# Patient Record
Sex: FEMALE | Race: OTHER | NOT HISPANIC OR LATINO | URBAN - METROPOLITAN AREA
[De-identification: names, ages, dates, MRNs, and addresses within clinical notes are randomized per-mention and may not be internally consistent; named-entity substitution may affect disease eponyms.]

---

## 2018-02-19 ENCOUNTER — INPATIENT (INPATIENT)
Facility: HOSPITAL | Age: 63
LOS: 4 days | Discharge: HOME | DRG: 440 | End: 2018-02-24
Attending: INTERNAL MEDICINE | Admitting: INTERNAL MEDICINE
Payer: COMMERCIAL

## 2018-02-19 VITALS
HEART RATE: 92 BPM | RESPIRATION RATE: 18 BRPM | DIASTOLIC BLOOD PRESSURE: 103 MMHG | TEMPERATURE: 98 F | SYSTOLIC BLOOD PRESSURE: 169 MMHG | WEIGHT: 220.02 LBS | HEIGHT: 61 IN | OXYGEN SATURATION: 96 %

## 2018-02-19 LAB
ALBUMIN SERPL ELPH-MCNC: 4.1 G/DL — SIGNIFICANT CHANGE UP (ref 3.5–5)
ALP SERPL-CCNC: 101 U/L — SIGNIFICANT CHANGE UP (ref 40–120)
ALT FLD-CCNC: 97 U/L DA — HIGH (ref 10–60)
ANION GAP SERPL CALC-SCNC: 9 MMOL/L — SIGNIFICANT CHANGE UP (ref 5–17)
APPEARANCE UR: CLEAR — SIGNIFICANT CHANGE UP
AST SERPL-CCNC: 96 U/L — HIGH (ref 10–40)
BACTERIA # UR AUTO: ABNORMAL /HPF
BASOPHILS # BLD AUTO: 0 K/UL — SIGNIFICANT CHANGE UP (ref 0–0.2)
BASOPHILS NFR BLD AUTO: 0.5 % — SIGNIFICANT CHANGE UP (ref 0–2)
BILIRUB SERPL-MCNC: 1 MG/DL — SIGNIFICANT CHANGE UP (ref 0.2–1.2)
BILIRUB UR-MCNC: NEGATIVE — SIGNIFICANT CHANGE UP
BUN SERPL-MCNC: 15 MG/DL — SIGNIFICANT CHANGE UP (ref 7–18)
CALCIUM SERPL-MCNC: 8.8 MG/DL — SIGNIFICANT CHANGE UP (ref 8.4–10.5)
CHLORIDE SERPL-SCNC: 104 MMOL/L — SIGNIFICANT CHANGE UP (ref 96–108)
CO2 SERPL-SCNC: 23 MMOL/L — SIGNIFICANT CHANGE UP (ref 22–31)
COLOR SPEC: YELLOW — SIGNIFICANT CHANGE UP
CREAT SERPL-MCNC: 0.47 MG/DL — LOW (ref 0.5–1.3)
DIFF PNL FLD: ABNORMAL
EOSINOPHIL # BLD AUTO: 0.1 K/UL — SIGNIFICANT CHANGE UP (ref 0–0.5)
EOSINOPHIL NFR BLD AUTO: 1.6 % — SIGNIFICANT CHANGE UP (ref 0–6)
EPI CELLS # UR: ABNORMAL /HPF
GLUCOSE SERPL-MCNC: 109 MG/DL — HIGH (ref 70–99)
GLUCOSE UR QL: NEGATIVE — SIGNIFICANT CHANGE UP
HCT VFR BLD CALC: 46.6 % — HIGH (ref 34.5–45)
HGB BLD-MCNC: 15.4 G/DL — SIGNIFICANT CHANGE UP (ref 11.5–15.5)
KETONES UR-MCNC: NEGATIVE — SIGNIFICANT CHANGE UP
LEUKOCYTE ESTERASE UR-ACNC: ABNORMAL
LIDOCAIN IGE QN: 4522 U/L — HIGH (ref 73–393)
LYMPHOCYTES # BLD AUTO: 0.7 K/UL — LOW (ref 1–3.3)
LYMPHOCYTES # BLD AUTO: 9.7 % — LOW (ref 13–44)
MCHC RBC-ENTMCNC: 29.1 PG — SIGNIFICANT CHANGE UP (ref 27–34)
MCHC RBC-ENTMCNC: 33.1 GM/DL — SIGNIFICANT CHANGE UP (ref 32–36)
MCV RBC AUTO: 87.8 FL — SIGNIFICANT CHANGE UP (ref 80–100)
MONOCYTES # BLD AUTO: 0.4 K/UL — SIGNIFICANT CHANGE UP (ref 0–0.9)
MONOCYTES NFR BLD AUTO: 5.5 % — SIGNIFICANT CHANGE UP (ref 2–14)
NEUTROPHILS # BLD AUTO: 6.1 K/UL — SIGNIFICANT CHANGE UP (ref 1.8–7.4)
NEUTROPHILS NFR BLD AUTO: 82.6 % — HIGH (ref 43–77)
NITRITE UR-MCNC: NEGATIVE — SIGNIFICANT CHANGE UP
PH UR: 5 — SIGNIFICANT CHANGE UP (ref 5–8)
PLATELET # BLD AUTO: 176 K/UL — SIGNIFICANT CHANGE UP (ref 150–400)
POTASSIUM SERPL-MCNC: 4.4 MMOL/L — SIGNIFICANT CHANGE UP (ref 3.5–5.3)
POTASSIUM SERPL-SCNC: 4.4 MMOL/L — SIGNIFICANT CHANGE UP (ref 3.5–5.3)
PROT SERPL-MCNC: 7.6 G/DL — SIGNIFICANT CHANGE UP (ref 6–8.3)
PROT UR-MCNC: 15
RBC # BLD: 5.3 M/UL — HIGH (ref 3.8–5.2)
RBC # FLD: 11.6 % — SIGNIFICANT CHANGE UP (ref 10.3–14.5)
RBC CASTS # UR COMP ASSIST: ABNORMAL /HPF (ref 0–2)
SODIUM SERPL-SCNC: 136 MMOL/L — SIGNIFICANT CHANGE UP (ref 135–145)
SP GR SPEC: 1.02 — SIGNIFICANT CHANGE UP (ref 1.01–1.02)
UROBILINOGEN FLD QL: 1
WBC # BLD: 7.4 K/UL — SIGNIFICANT CHANGE UP (ref 3.8–10.5)
WBC # FLD AUTO: 7.4 K/UL — SIGNIFICANT CHANGE UP (ref 3.8–10.5)
WBC UR QL: SIGNIFICANT CHANGE UP /HPF (ref 0–5)

## 2018-02-19 PROCEDURE — 74177 CT ABD & PELVIS W/CONTRAST: CPT | Mod: 26

## 2018-02-19 RX ORDER — SODIUM CHLORIDE 9 MG/ML
1000 INJECTION INTRAMUSCULAR; INTRAVENOUS; SUBCUTANEOUS ONCE
Refills: 0 | Status: COMPLETED | OUTPATIENT
Start: 2018-02-19 | End: 2018-02-19

## 2018-02-19 RX ORDER — MORPHINE SULFATE 50 MG/1
4 CAPSULE, EXTENDED RELEASE ORAL ONCE
Refills: 0 | Status: DISCONTINUED | OUTPATIENT
Start: 2018-02-19 | End: 2018-02-19

## 2018-02-19 RX ORDER — ONDANSETRON 8 MG/1
4 TABLET, FILM COATED ORAL ONCE
Refills: 0 | Status: COMPLETED | OUTPATIENT
Start: 2018-02-19 | End: 2018-02-19

## 2018-02-19 RX ADMIN — SODIUM CHLORIDE 4000 MILLILITER(S): 9 INJECTION INTRAMUSCULAR; INTRAVENOUS; SUBCUTANEOUS at 20:41

## 2018-02-19 RX ADMIN — MORPHINE SULFATE 4 MILLIGRAM(S): 50 CAPSULE, EXTENDED RELEASE ORAL at 22:50

## 2018-02-19 RX ADMIN — ONDANSETRON 4 MILLIGRAM(S): 8 TABLET, FILM COATED ORAL at 20:34

## 2018-02-19 RX ADMIN — MORPHINE SULFATE 4 MILLIGRAM(S): 50 CAPSULE, EXTENDED RELEASE ORAL at 20:41

## 2018-02-19 RX ADMIN — MORPHINE SULFATE 4 MILLIGRAM(S): 50 CAPSULE, EXTENDED RELEASE ORAL at 22:51

## 2018-02-20 DIAGNOSIS — E78.5 HYPERLIPIDEMIA, UNSPECIFIED: ICD-10-CM

## 2018-02-20 DIAGNOSIS — R92.8 OTHER ABNORMAL AND INCONCLUSIVE FINDINGS ON DIAGNOSTIC IMAGING OF BREAST: ICD-10-CM

## 2018-02-20 DIAGNOSIS — K29.70 GASTRITIS, UNSPECIFIED, WITHOUT BLEEDING: ICD-10-CM

## 2018-02-20 DIAGNOSIS — K85.90 ACUTE PANCREATITIS WITHOUT NECROSIS OR INFECTION, UNSPECIFIED: ICD-10-CM

## 2018-02-20 DIAGNOSIS — I10 ESSENTIAL (PRIMARY) HYPERTENSION: ICD-10-CM

## 2018-02-20 DIAGNOSIS — Z29.9 ENCOUNTER FOR PROPHYLACTIC MEASURES, UNSPECIFIED: ICD-10-CM

## 2018-02-20 LAB
24R-OH-CALCIDIOL SERPL-MCNC: 11.9 NG/ML — LOW (ref 30–80)
ALBUMIN SERPL ELPH-MCNC: 3.8 G/DL — SIGNIFICANT CHANGE UP (ref 3.5–5)
ALP SERPL-CCNC: 137 U/L — HIGH (ref 40–120)
ALT FLD-CCNC: 303 U/L DA — HIGH (ref 10–60)
ANION GAP SERPL CALC-SCNC: 5 MMOL/L — SIGNIFICANT CHANGE UP (ref 5–17)
AST SERPL-CCNC: 201 U/L — HIGH (ref 10–40)
BASOPHILS # BLD AUTO: 0 K/UL — SIGNIFICANT CHANGE UP (ref 0–0.2)
BASOPHILS NFR BLD AUTO: 0.4 % — SIGNIFICANT CHANGE UP (ref 0–2)
BILIRUB SERPL-MCNC: 1.3 MG/DL — HIGH (ref 0.2–1.2)
BUN SERPL-MCNC: 11 MG/DL — SIGNIFICANT CHANGE UP (ref 7–18)
CALCIUM SERPL-MCNC: 8.7 MG/DL — SIGNIFICANT CHANGE UP (ref 8.4–10.5)
CHLORIDE SERPL-SCNC: 107 MMOL/L — SIGNIFICANT CHANGE UP (ref 96–108)
CHOLEST SERPL-MCNC: 200 MG/DL — HIGH (ref 10–199)
CO2 SERPL-SCNC: 28 MMOL/L — SIGNIFICANT CHANGE UP (ref 22–31)
CREAT SERPL-MCNC: 0.44 MG/DL — LOW (ref 0.5–1.3)
EOSINOPHIL # BLD AUTO: 0.1 K/UL — SIGNIFICANT CHANGE UP (ref 0–0.5)
EOSINOPHIL NFR BLD AUTO: 1.8 % — SIGNIFICANT CHANGE UP (ref 0–6)
FOLATE SERPL-MCNC: 17.5 NG/ML — SIGNIFICANT CHANGE UP (ref 4.8–24.2)
GLUCOSE SERPL-MCNC: 105 MG/DL — HIGH (ref 70–99)
HBA1C BLD-MCNC: 5.5 % — SIGNIFICANT CHANGE UP (ref 4–5.6)
HCT VFR BLD CALC: 42.5 % — SIGNIFICANT CHANGE UP (ref 34.5–45)
HDLC SERPL-MCNC: 55 MG/DL — SIGNIFICANT CHANGE UP (ref 40–125)
HGB BLD-MCNC: 14.4 G/DL — SIGNIFICANT CHANGE UP (ref 11.5–15.5)
INR BLD: 1.04 RATIO — SIGNIFICANT CHANGE UP (ref 0.88–1.16)
LIDOCAIN IGE QN: 984 U/L — HIGH (ref 73–393)
LIPID PNL WITH DIRECT LDL SERPL: 110 MG/DL — SIGNIFICANT CHANGE UP
LYMPHOCYTES # BLD AUTO: 0.7 K/UL — LOW (ref 1–3.3)
LYMPHOCYTES # BLD AUTO: 16.3 % — SIGNIFICANT CHANGE UP (ref 13–44)
MAGNESIUM SERPL-MCNC: 2.3 MG/DL — SIGNIFICANT CHANGE UP (ref 1.6–2.6)
MCHC RBC-ENTMCNC: 30.3 PG — SIGNIFICANT CHANGE UP (ref 27–34)
MCHC RBC-ENTMCNC: 34 GM/DL — SIGNIFICANT CHANGE UP (ref 32–36)
MCV RBC AUTO: 89 FL — SIGNIFICANT CHANGE UP (ref 80–100)
MONOCYTES # BLD AUTO: 0.3 K/UL — SIGNIFICANT CHANGE UP (ref 0–0.9)
MONOCYTES NFR BLD AUTO: 6 % — SIGNIFICANT CHANGE UP (ref 2–14)
NEUTROPHILS # BLD AUTO: 3.3 K/UL — SIGNIFICANT CHANGE UP (ref 1.8–7.4)
NEUTROPHILS NFR BLD AUTO: 75.4 % — SIGNIFICANT CHANGE UP (ref 43–77)
PHOSPHATE SERPL-MCNC: 3.3 MG/DL — SIGNIFICANT CHANGE UP (ref 2.5–4.5)
PLATELET # BLD AUTO: 156 K/UL — SIGNIFICANT CHANGE UP (ref 150–400)
POTASSIUM SERPL-MCNC: 3.6 MMOL/L — SIGNIFICANT CHANGE UP (ref 3.5–5.3)
POTASSIUM SERPL-SCNC: 3.6 MMOL/L — SIGNIFICANT CHANGE UP (ref 3.5–5.3)
PROT SERPL-MCNC: 7.3 G/DL — SIGNIFICANT CHANGE UP (ref 6–8.3)
PROTHROM AB SERPL-ACNC: 11.3 SEC — SIGNIFICANT CHANGE UP (ref 9.8–12.7)
RBC # BLD: 4.77 M/UL — SIGNIFICANT CHANGE UP (ref 3.8–5.2)
RBC # FLD: 12 % — SIGNIFICANT CHANGE UP (ref 10.3–14.5)
SODIUM SERPL-SCNC: 140 MMOL/L — SIGNIFICANT CHANGE UP (ref 135–145)
TOTAL CHOLESTEROL/HDL RATIO MEASUREMENT: 3.6 RATIO — SIGNIFICANT CHANGE UP (ref 3.3–7.1)
TRIGL SERPL-MCNC: 175 MG/DL — HIGH (ref 10–149)
TSH SERPL-MCNC: 1.41 UU/ML — SIGNIFICANT CHANGE UP (ref 0.34–4.82)
VIT B12 SERPL-MCNC: 1066 PG/ML — SIGNIFICANT CHANGE UP (ref 232–1245)
WBC # BLD: 4.4 K/UL — SIGNIFICANT CHANGE UP (ref 3.8–10.5)
WBC # FLD AUTO: 4.4 K/UL — SIGNIFICANT CHANGE UP (ref 3.8–10.5)

## 2018-02-20 PROCEDURE — 76705 ECHO EXAM OF ABDOMEN: CPT | Mod: 26

## 2018-02-20 PROCEDURE — 93010 ELECTROCARDIOGRAM REPORT: CPT

## 2018-02-20 PROCEDURE — 99285 EMERGENCY DEPT VISIT HI MDM: CPT | Mod: 25

## 2018-02-20 RX ORDER — LOSARTAN POTASSIUM 100 MG/1
50 TABLET, FILM COATED ORAL DAILY
Refills: 0 | Status: DISCONTINUED | OUTPATIENT
Start: 2018-02-20 | End: 2018-02-24

## 2018-02-20 RX ORDER — ONDANSETRON 8 MG/1
4 TABLET, FILM COATED ORAL EVERY 4 HOURS
Refills: 0 | Status: DISCONTINUED | OUTPATIENT
Start: 2018-02-20 | End: 2018-02-24

## 2018-02-20 RX ORDER — PANTOPRAZOLE SODIUM 20 MG/1
40 TABLET, DELAYED RELEASE ORAL
Refills: 0 | Status: DISCONTINUED | OUTPATIENT
Start: 2018-02-20 | End: 2018-02-24

## 2018-02-20 RX ORDER — SODIUM CHLORIDE 9 MG/ML
1000 INJECTION, SOLUTION INTRAVENOUS
Refills: 0 | Status: DISCONTINUED | OUTPATIENT
Start: 2018-02-20 | End: 2018-02-21

## 2018-02-20 RX ORDER — KETOROLAC TROMETHAMINE 30 MG/ML
15 SYRINGE (ML) INJECTION EVERY 4 HOURS
Refills: 0 | Status: DISCONTINUED | OUTPATIENT
Start: 2018-02-20 | End: 2018-02-24

## 2018-02-20 RX ORDER — SIMVASTATIN 20 MG/1
40 TABLET, FILM COATED ORAL AT BEDTIME
Refills: 0 | Status: DISCONTINUED | OUTPATIENT
Start: 2018-02-20 | End: 2018-02-21

## 2018-02-20 RX ORDER — ENOXAPARIN SODIUM 100 MG/ML
40 INJECTION SUBCUTANEOUS DAILY
Refills: 0 | Status: DISCONTINUED | OUTPATIENT
Start: 2018-02-20 | End: 2018-02-24

## 2018-02-20 RX ORDER — MORPHINE SULFATE 50 MG/1
2 CAPSULE, EXTENDED RELEASE ORAL EVERY 4 HOURS
Refills: 0 | Status: DISCONTINUED | OUTPATIENT
Start: 2018-02-20 | End: 2018-02-24

## 2018-02-20 RX ADMIN — Medication 15 MILLIGRAM(S): at 22:30

## 2018-02-20 RX ADMIN — SIMVASTATIN 40 MILLIGRAM(S): 20 TABLET, FILM COATED ORAL at 22:30

## 2018-02-20 RX ADMIN — SODIUM CHLORIDE 200 MILLILITER(S): 9 INJECTION, SOLUTION INTRAVENOUS at 04:20

## 2018-02-20 RX ADMIN — ENOXAPARIN SODIUM 40 MILLIGRAM(S): 100 INJECTION SUBCUTANEOUS at 12:00

## 2018-02-20 RX ADMIN — Medication 15 MILLIGRAM(S): at 22:45

## 2018-02-20 RX ADMIN — LOSARTAN POTASSIUM 50 MILLIGRAM(S): 100 TABLET, FILM COATED ORAL at 05:57

## 2018-02-20 RX ADMIN — SODIUM CHLORIDE 200 MILLILITER(S): 9 INJECTION, SOLUTION INTRAVENOUS at 18:00

## 2018-02-20 RX ADMIN — MORPHINE SULFATE 4 MILLIGRAM(S): 50 CAPSULE, EXTENDED RELEASE ORAL at 02:12

## 2018-02-20 RX ADMIN — PANTOPRAZOLE SODIUM 40 MILLIGRAM(S): 20 TABLET, DELAYED RELEASE ORAL at 06:49

## 2018-02-20 RX ADMIN — SODIUM CHLORIDE 200 MILLILITER(S): 9 INJECTION, SOLUTION INTRAVENOUS at 23:44

## 2018-02-20 NOTE — H&P ADULT - PROBLEM SELECTOR PLAN 4
Ct scan with some breast density, patient usually gets mammogram every 6 months for f/up , recent mammogram was normal

## 2018-02-20 NOTE — CONSULT NOTE ADULT - SUBJECTIVE AND OBJECTIVE BOX
[  ] STAT REQUEST              [ X ] ROUTINE REQUEST    Patient is a 62 year old female admitted with abdominal pain. GI consulted to evaluate.     HPI:  62 year old female presented with 2 days h/o epigastric abdominal pain radiating to her back associated with nausea and vomiting.  Patient denies hematemesis, hematochezia, melena, fever, chills, chest pain, SOB, palpitation, hematuria, dysuria, hemoptysis, cough or diarrhea.              PAIN MANAGEMENT:  Pain Scale:                7-8 /10  Pain Location:  Epigastric abdominal pain    Prior Colonoscopy: 2 years ago    PAST MEDICAL HISTORY  HLD (hyperlipidemia)  HTN (hypertension)  Breast fibroadenoma  Gastritis      PAST SURGICAL HISTORY  Shoulder surgery  Cholecystectomy  Bladder surgery         Allergies    No Known Allergies    Intolerances        HOME MEDICATIONS    MEDICATIONS  (STANDING):  enoxaparin Injectable 40 milliGRAM(s) SubCutaneous daily  lactated ringers. 1000 milliLiter(s) (200 mL/Hr) IV Continuous <Continuous>  losartan 50 milliGRAM(s) Oral daily  pantoprazole    Tablet 40 milliGRAM(s) Oral before breakfast  simvastatin 40 milliGRAM(s) Oral at bedtime    MEDICATIONS  (PRN):  ketorolac   Injectable 15 milliGRAM(s) IV Push every 4 hours PRN Moderate Pain (4 - 6)  morphine  - Injectable 2 milliGRAM(s) IV Push every 4 hours PRN Severe Pain (7 - 10)  ondansetron Injectable 4 milliGRAM(s) IV Push every 4 hours PRN Nausea and/or Vomiting      SOCIAL HISTORY  Advanced Directives:       [ X ] Full Code       [  ] DNR  Marital Status:         [ X ] M      [  ] S      [  ] D       [  ] W  Children:       [ X ] Yes      [  ] No  Occupation:        [  ] Employed       [ X ] Unemployed       [  ] Retired  Diet:       [X ] Regular       [  ] PEG feeding          [  ] NG tube feeding  Drug Use:           [ X ] Patient denied          [  ] Yes  Alcohol:           [  ] No             [ X ] Yes (socially)         [  ] Yes (chronic)  Tobacco:           [  ] Yes           [X  ] No        FAMILY HISTORY  [ X ] Heart Disease (father)            [  ] Diabetes             [  ] HTN             [  ] Colon Cancer             [  ] Stomach Cancer              [  ] Pancreatic Cancer        VITAL SIGNS  Vital Signs Last 24 Hrs  T(C): 37.2 (20 Feb 2018 15:48), Max: 37.2 (20 Feb 2018 15:48)  T(F): 99 (20 Feb 2018 15:48), Max: 99 (20 Feb 2018 15:48)  HR: 95 (20 Feb 2018 15:48) (75 - 95)  BP: 120/84 (20 Feb 2018 15:48) (120/84 - 169/103)   RR: 18 (20 Feb 2018 15:48) (16 - 18)  SpO2: 99% (20 Feb 2018 15:48) (96% - 99%)   Daily Height in cm: 154.94 (19 Feb 2018 19:59)             CBC Full  -  ( 20 Feb 2018 07:20 )  WBC Count : 4.4 K/uL  Hemoglobin : 14.4 g/dL  Hematocrit : 42.5 %  Platelet Count - Automated : 156 K/uL  Mean Cell Volume : 89.0 fl  Mean Cell Hemoglobin : 30.3 pg  Mean Cell Hemoglobin Concentration : 34.0 gm/dL  Auto Neutrophil # : 3.3 K/uL  Auto Lymphocyte # : 0.7 K/uL  Auto Monocyte # : 0.3 K/uL  Auto Eosinophil # : 0.1 K/uL  Auto Basophil # : 0.0 K/uL  Auto Neutrophil % : 75.4 %  Auto Lymphocyte % : 16.3 %  Auto Monocyte % : 6.0 %  Auto Eosinophil % : 1.8 %  Auto Basophil % : 0.4 %      02-20    140  |  107  |  11  ----------------------------<  105<H>  3.6   |  28  |  0.44<L>    Ca    8.7      20 Feb 2018 07:20  Phos  3.3     02-20  Mg     2.3     02-20    TPro  7.3  /  Alb  3.8  /  TBili  1.3<H>  /  DBili  x   /  AST  201<H>  /  ALT  303<H>  /  AlkPhos  137<H>  02-20    Lipase, Serum: 984 U/L (02-20 @ 07:20)  Lipase, Serum: 4522 U/L (02-19 @ 20:53)    PT/INR - ( 20 Feb 2018 07:20 )   PT: 11.3 sec;   INR: 1.04 ratio       Urinalysis + Microscopic Examination (02.19.18 @ 23:01)    Glucose Qualitative, Urine: Negative    Color: Yellow    Blood, Urine: Large    Urine Appearance: Clear    Bilirubin: Negative    Urobilinogen: 1    Specific Gravity: 1.025    Protein, Urine: 15    pH Urine: 5.0    Leukocyte Esterase Concentration: Trace    Nitrite: Negative    Ketone - Urine: Negative    Red Blood Cell - Urine: 2-5 /HPF    White Blood Cell - Urine: 0-2 /HPF    Epithelial Cells: Occasional /HPF    Bacteria: Trace /HPF    ECG  Ventricular Rate 83 BPM    Atrial Rate 83 BPM    P-R Interval 146 ms    QRS Duration 80 ms     ms    QTc 461 ms    P Axis 38 degrees    R Axis 3 degrees    T Axis 55 degrees    Diagnosis Line Normal sinus rhythm  Normal ECG      RADIOLOGY/IMAGING     EXAM:  CT ABDOMEN AND PELVIS IC                            PROCEDURE DATE:  02/19/2018          INTERPRETATION:  CLINICAL INFORMATION: Abdominal pain and vomiting    COMPARISON: None    PROCEDURE:   CT of the Abdomen and Pelvis was performed with intravenous contrast.   Intravenous contrast: 92 ml Omnipaque 350.   Oral contrast: None.  Sagittal and coronal reformats were performed.    FINDINGS:    LOWER CHEST: Bibasilar atelectasis. Mild cardiomegaly. Asymmetric left   breast density.    LIVER:8 mm hypodensity is nonspecific. Slight intrahepatic ductal   dilatation may be related to postcholecystectomy state  GALLBLADDER: Postcholecystectomy  SPLEEN: Within normal limits.  PANCREAS: Within normal limits.  ADRENALS: Within normal limits.  KIDNEYS/URETERS: Within normal limits.    BLADDER: Underdistended  REPRODUCTIVE ORGANS: Unremarkable    BOWEL: Small hiatal hernia. Limited without oral contrast. Normal   appendix. No evidence of bowel obstruction.  PERITONEUM: No ascites.  VESSELS:  Trace atherosclerosis  RETROPERITONEUM: No lymphadenopathy.    ABDOMINAL WALL: Tiny fat-containing umbilical hernia  BONES: Degenerative changes    IMPRESSION:    No definite acute abnormality.    Asymmetric left breast density. Correlate with mammogram.                 EXAM:  US LIVER AND PANCREAS                            PROCEDURE DATE:  02/20/2018          INTERPRETATION:  Right upper quadrant abdominal ultrasound    Indication: Right upper quadrant abdominal pain.    Right upper quadrant abdominal ultrasoundis performed without a previous   abdominal ultrasound for comparison. The liver spans 19.1 cm which is   enlarged. There is a 1.3 cm hepatic cyst.    Duplex Doppler interrogation of the main portal vein demonstrates normal   hepatopedal flow.    Status post cholecystectomy. Mild common bile duct dilatation at 8.7 mm   in caliber, probably due to post cholecystectomy status.    The pancreas is not well-visualized due to overlying bowel gas.    The right kidney measures 12.9 cm in length which is within normal limits   in size. The right kidney appears grossly unremarkable without   hydronephrosis.    No ascites is detected. IVC and aorta appear grossly unremarkable.    Impression: Status post cholecystectomy.    1.3 cm hepatic cyst.    Hepatomegaly.
Patient is a 62y old  Female who presents with a chief complaint of abdominal pain, nausea, vomiting (2018 02:10)    History of Present Illness:  Chief Complaint/Reason for Admission: abdominal pain, nausea, vomiting	  History of Present Illness: 	  63 Y/O F, Martiniquais speaking, from home, PMhx of HTN, HLD, gastritis, breast fibroadenoma, PShx of cholecystectomy, shoulder surgery, some bladder surgery , came with worsening abdominal pain since last 2 days. Pain is in epigastric region, no radiation, associated with nausea, vomiting 4 times ( clear), burning, no radiation, aggravating factor. Patient reports that since last 2 days she is unable to eat due to that pain. Reports that she ate [place] pizza before having the pain. Had normal colonoscopy , EGD about 2 yrs ago. Got mammogram every 6 months due to fibro fatty tissue. Denies any chest pain, urinary or bowel complains, fever.     INTERVAL HPI/OVERNIGHT EVENTS:  T(C): 36.7 (18 @ 12:10), Max: 36.7 (18 @ 12:10)  HR: 89 (18 @ 12:10) (75 - 92)  BP: 124/81 (18 @ 12:10) (124/81 - 169/103)  RR: 16 (18 @ 12:10) (16 - 18)  SpO2: 98% (18 @ 12:10) (96% - 98%)  Wt(kg): --  I&O's Summary      PAST MEDICAL & SURGICAL HISTORY:  HLD (hyperlipidemia)  HTN (hypertension)  S/P shoulder surgery: Right      SOCIAL HISTORY  Alcohol:  Tobacco:  Illicit substance use:      FAMILY HISTORY:      LABS:                        14.4   4.4   )-----------( 156      ( 2018 07:20 )             42.5     02-20    140  |  107  |  11  ----------------------------<  105<H>  3.6   |  28  |  0.44<L>    Ca    8.7      2018 07:20  Phos  3.3     02-20  Mg     2.3     02-20    TPro  7.3  /  Alb  3.8  /  TBili  1.3<H>  /  DBili  x   /  AST  201<H>  /  ALT  303<H>  /  AlkPhos  137<H>  02-20    PT/INR - ( 2018 07:20 )   PT: 11.3 sec;   INR: 1.04 ratio           Urinalysis Basic - ( 2018 23:01 )    Color: Yellow / Appearance: Clear / S.025 / pH: x  Gluc: x / Ketone: Negative  / Bili: Negative / Urobili: 1   Blood: x / Protein: 15 / Nitrite: Negative   Leuk Esterase: Trace / RBC: 2-5 /HPF / WBC 0-2 /HPF   Sq Epi: x / Non Sq Epi: Occasional /HPF / Bacteria: Trace /HPF      CAPILLARY BLOOD GLUCOSE            Urinalysis Basic - ( 2018 23:01 )    Color: Yellow / Appearance: Clear / S.025 / pH: x  Gluc: x / Ketone: Negative  / Bili: Negative / Urobili: 1   Blood: x / Protein: 15 / Nitrite: Negative   Leuk Esterase: Trace / RBC: 2-5 /HPF / WBC 0-2 /HPF   Sq Epi: x / Non Sq Epi: Occasional /HPF / Bacteria: Trace /HPF        MEDICATIONS  (STANDING):  enoxaparin Injectable 40 milliGRAM(s) SubCutaneous daily  lactated ringers. 1000 milliLiter(s) (200 mL/Hr) IV Continuous <Continuous>  losartan 50 milliGRAM(s) Oral daily  pantoprazole    Tablet 40 milliGRAM(s) Oral before breakfast  simvastatin 40 milliGRAM(s) Oral at bedtime    MEDICATIONS  (PRN):  ketorolac   Injectable 15 milliGRAM(s) IV Push every 4 hours PRN Moderate Pain (4 - 6)  morphine  - Injectable 2 milliGRAM(s) IV Push every 4 hours PRN Severe Pain (7 - 10)  ondansetron Injectable 4 milliGRAM(s) IV Push every 4 hours PRN Nausea and/or Vomiting      REVIEW OF SYSTEMS:  CONSTITUTIONAL: No fever, weight loss, or fatigue  EYES: No eye pain, visual disturbances, or discharge  ENMT:  No difficulty hearing, tinnitus, vertigo; No sinus or throat pain  NECK: No pain or stiffness  RESPIRATORY: No cough, wheezing, chills or hemoptysis; No shortness of breath  CARDIOVASCULAR: No chest pain, palpitations, dizziness, or leg swelling  GASTROINTESTINAL: +abdominal  pain. No nausea, vomiting, or hematemesis; No diarrhea or constipation. No melena or hematochezia.  GENITOURINARY: No dysuria, frequency, hematuria, or incontinence  NEUROLOGICAL: No headaches, memory loss, loss of strength, numbness, or tremors  SKIN: No itching, burning, rashes, or lesions   LYMPH NODES: No enlarged glands  ENDOCRINE: No heat or cold intolerance; No hair loss  MUSCULOSKELETAL: No joint pain or swelling; No muscle, back, or extremity pain  PSYCHIATRIC: No depression, anxiety, mood swings, or difficulty sleeping  HEME/LYMPH: No easy bruising, or bleeding gums  ALLERY AND IMMUNOLOGIC: No hives or eczema    PHYSICAL EXAM:  GENERAL: NAD, well-groomed, well-developed  HEAD:  Atraumatic, Normocephalic  EYES: EOMI, PERRLA, conjunctiva and sclera clear  ENMT: No tonsillar erythema, exudates, or enlargement; Moist mucous membranes, Good dentition, No lesions  NECK: Supple, No JVD, Normal thyroid  NERVOUS SYSTEM:  Alert & Oriented X3, Good concentration; Motor Strength 5/5 B/L upper and lower extremities; DTRs 2+ intact and symmetric  CHEST/LUNG: Clear to percussion bilaterally; No rales, rhonchi, wheezing, or rubs  HEART: Regular rate and rhythm; No murmurs, rubs, or gallops  ABDOMEN: Soft, mod tender on epigastrium  EXTREMITIES:  2+ Peripheral Pulses, No clubbing, cyanosis, or edema  LYMPH: No lymphadenopathy noted  SKIN: No rashes or lesions    RADIOLOGY & ADDITIONAL TESTS:  < from: Xray Chest 2 Views PA/Lat (10.06.13 @ 00:24) >  IMPRESSION: Clear lungs.    < end of copied text >  < from: CT Abdomen and Pelvis w/ IV Cont (18 @ 22:58) >  No definite acute abnormality.    Asymmetric left breast density. Correlate with mammogram.  < from: US Hepatic & Pancreatic (18 @ 10:38) >  Impression: Status post cholecystectomy.    1.3 cm hepatic cyst.    Hepatomegaly.    < end of copied text >      < end of copied text >    Imaging Personally Reviewed:  [ x] YES  [ ] NO    Consultant(s) Notes Reviewed:  [ x] YES  [ ] NO        Care Discussed with Consultants/Other Providers [ x] YES  [ ] NO

## 2018-02-20 NOTE — H&P ADULT - MUSCULOSKELETAL
detailed exam ROM intact/no joint erythema/no joint warmth/no calf tenderness/normal strength/no joint swelling

## 2018-02-20 NOTE — H&P ADULT - GASTROINTESTINAL DETAILS
no masses palpable/bowel sounds normal/no guarding/no organomegaly/no distention/no bruit/no rebound tenderness/no rigidity/soft

## 2018-02-20 NOTE — H&P ADULT - HISTORY OF PRESENT ILLNESS
61 Y/O F, Moroccan speaking, from home, PMhx of HTN, HLD, gastritis, breast fibroadenoma, PShx of cholecystectomy, shoulder surgery, some bladder surgery , came with worsening abdominal pain since last 2 days. Pain is in epigastric region, no radiation, associated with nausea, vomiting 4 times ( clear), burning, no radiation, aggravating factor. Patient reports that since last 2 days she is unable to eat due to that pain. Reports that she ate [place] pizza before having the pain. Had normal colonoscopy , EGD about 2 yrs ago. Got mammogram every 6 months due to fibro fatty tissue. Denies any chest pain, urinary or bowel complains, fever.     Allergies: NKDA  ShxL: Non-smoker, non-alcoholic, no substance abuse   Fhx: Mother had stroke, father DM.     In the ED initial vitals shows BP of 169/103, labs with lipase of >4000, , AST 97, ALT 97, UA negative, CXR with some atelectasis CT scan abdomen with small hypodensity, minimal ductal dilatation breast density, got 1 L NS bolus, morphine 4 mg x 2, Zofran, ECG with NSR at 84 bpm, mild LVH,  admitted to medicine for pancreatitis.

## 2018-02-20 NOTE — ED PROVIDER NOTE - OBJECTIVE STATEMENT
62 female 2 days sharp constant moderate abdominal pain, nausea, vomiring no diarrhea. no fever or chills . epigastric. no hx of such pain. no etoh use. not worse w food.

## 2018-02-20 NOTE — ED PROVIDER NOTE - MEDICAL DECISION MAKING DETAILS
concern for gastritis, pancratitis, gerd, atypical presentation of cp. given reproducable abdominal ttp will ct. labs.

## 2018-02-20 NOTE — H&P ADULT - RS GEN PE MLT RESP DETAILS PC
breath sounds equal/normal/airway patent/no rhonchi/no subcutaneous emphysema/respirations non-labored/no rales/no wheezes/no chest wall tenderness/no intercostal retractions/clear to auscultation bilaterally/good air movement

## 2018-02-20 NOTE — H&P ADULT - PROBLEM SELECTOR PLAN 1
Abdominal pain, nausea, vomiting   Epigastric tenderness   Meets 2/3 criteria   Lipase >4000, ( 4522), TAGs 246  Likely due to hypertriglyceridemia   AST 96, ALT 97  Ct scan with no pancreatitis, small hypodensity 8 mm in liver, minimal ductal dilatation  S/p 1 L NS, morphine   C/w LR at 200 cc, NPO, zofran, toradol, morphine for pain control  GI consult Dr Fink

## 2018-02-20 NOTE — CONSULT NOTE ADULT - ASSESSMENT
1. Abdominal pain  2. Acute pancreatitis  3. R/o CBD stone    Suggestions:    1. NPO  2. IVF hydration  3. Protonix daily  4. MRCP  5. Avoid NSAID  6. Follow up LFT's  7. DVT prophylaxis

## 2018-02-21 ENCOUNTER — TRANSCRIPTION ENCOUNTER (OUTPATIENT)
Age: 63
End: 2018-02-21

## 2018-02-21 DIAGNOSIS — R74.0 NONSPECIFIC ELEVATION OF LEVELS OF TRANSAMINASE AND LACTIC ACID DEHYDROGENASE [LDH]: ICD-10-CM

## 2018-02-21 LAB
ANION GAP SERPL CALC-SCNC: 7 MMOL/L — SIGNIFICANT CHANGE UP (ref 5–17)
BUN SERPL-MCNC: 7 MG/DL — SIGNIFICANT CHANGE UP (ref 7–18)
CALCIUM SERPL-MCNC: 8.4 MG/DL — SIGNIFICANT CHANGE UP (ref 8.4–10.5)
CHLORIDE SERPL-SCNC: 107 MMOL/L — SIGNIFICANT CHANGE UP (ref 96–108)
CO2 SERPL-SCNC: 29 MMOL/L — SIGNIFICANT CHANGE UP (ref 22–31)
CREAT SERPL-MCNC: 0.41 MG/DL — LOW (ref 0.5–1.3)
GLUCOSE SERPL-MCNC: 90 MG/DL — SIGNIFICANT CHANGE UP (ref 70–99)
HCT VFR BLD CALC: 42.1 % — SIGNIFICANT CHANGE UP (ref 34.5–45)
HGB BLD-MCNC: 13.6 G/DL — SIGNIFICANT CHANGE UP (ref 11.5–15.5)
LIDOCAIN IGE QN: 92 U/L — SIGNIFICANT CHANGE UP (ref 73–393)
MCHC RBC-ENTMCNC: 28.4 PG — SIGNIFICANT CHANGE UP (ref 27–34)
MCHC RBC-ENTMCNC: 32.2 GM/DL — SIGNIFICANT CHANGE UP (ref 32–36)
MCV RBC AUTO: 88.2 FL — SIGNIFICANT CHANGE UP (ref 80–100)
PLATELET # BLD AUTO: 141 K/UL — LOW (ref 150–400)
POTASSIUM SERPL-MCNC: 3.4 MMOL/L — LOW (ref 3.5–5.3)
POTASSIUM SERPL-SCNC: 3.4 MMOL/L — LOW (ref 3.5–5.3)
RBC # BLD: 4.78 M/UL — SIGNIFICANT CHANGE UP (ref 3.8–5.2)
RBC # FLD: 12.1 % — SIGNIFICANT CHANGE UP (ref 10.3–14.5)
SODIUM SERPL-SCNC: 143 MMOL/L — SIGNIFICANT CHANGE UP (ref 135–145)
WBC # BLD: 3.7 K/UL — LOW (ref 3.8–10.5)
WBC # FLD AUTO: 3.7 K/UL — LOW (ref 3.8–10.5)

## 2018-02-21 RX ORDER — SODIUM CHLORIDE 9 MG/ML
1000 INJECTION, SOLUTION INTRAVENOUS
Refills: 0 | Status: DISCONTINUED | OUTPATIENT
Start: 2018-02-21 | End: 2018-02-24

## 2018-02-21 RX ADMIN — ENOXAPARIN SODIUM 40 MILLIGRAM(S): 100 INJECTION SUBCUTANEOUS at 12:03

## 2018-02-21 RX ADMIN — Medication 15 MILLIGRAM(S): at 12:22

## 2018-02-21 RX ADMIN — Medication 15 MILLIGRAM(S): at 12:03

## 2018-02-21 RX ADMIN — LOSARTAN POTASSIUM 50 MILLIGRAM(S): 100 TABLET, FILM COATED ORAL at 06:33

## 2018-02-21 RX ADMIN — SODIUM CHLORIDE 100 MILLILITER(S): 9 INJECTION, SOLUTION INTRAVENOUS at 23:51

## 2018-02-21 RX ADMIN — Medication 15 MILLIGRAM(S): at 23:51

## 2018-02-21 RX ADMIN — PANTOPRAZOLE SODIUM 40 MILLIGRAM(S): 20 TABLET, DELAYED RELEASE ORAL at 06:33

## 2018-02-21 NOTE — PROGRESS NOTE ADULT - SUBJECTIVE AND OBJECTIVE BOX
I examined the pt at bedside, pt was c/o mild upper abd tenderness radiating to back, pt has not had a BM since monday. pt will be scheduled for MRCP as per GI. Pt is NPO.     REVIEW OF SYSTEMS:    CONSTITUTIONAL: No weakness, fevers or chills  EYES/ENT: No visual changes;  No vertigo or throat pain   NECK: No pain or stiffness  RESPIRATORY: No cough, wheezing, hemoptysis; No shortness of breath  CARDIOVASCULAR: No chest pain or palpitations  GASTROINTESTINAL: + abdominal  epigastric pain. No nausea, vomiting, or hematemesis; No diarrhea or constipation. No melena or hematochezia.  GENITOURINARY: No dysuria, frequency or hematuria  NEUROLOGICAL: No numbness or weakness  SKIN: No itching, burning, rashes, or lesions   All other review of systems is negative unless indicated above.    Physical Exam    General: WN/WD NAD  Neurology: A&Ox3, nonfocal, NAILS x 4  Respiratory: CTA B/L  CV: RRR, S1S2, no murmurs, rubs or gallops  Abdominal: Soft, Tender on epigastrium, ND +BS, Last BM  Extremities: No edema, + peripheral pulses      Allergies  No Known Allergies      Health Issues  Acute pancreatitis without infection or necrosis  HLD (hyperlipidemia)  HTN (hypertension)  S/P shoulder surgery      Vitals  T(F): 98.5 (02-21-18 @ 08:11), Max: 99 (02-20-18 @ 15:48)  HR: 75 (02-21-18 @ 08:11) (68 - 95)  BP: 135/77 (02-21-18 @ 08:11) (120/84 - 151/88)  RR: 16 (02-21-18 @ 08:11) (16 - 18)  SpO2: 99% (02-21-18 @ 08:11) (97% - 100%)  Wt(kg): --  CAPILLARY BLOOD GLUCOSE          Labs                          13.6   3.7   )-----------( 141      ( 21 Feb 2018 06:14 )             42.1       02-21    143  |  107  |  7   ----------------------------<  90  3.4<L>   |  29  |  0.41<L>    Ca    8.4      21 Feb 2018 06:14  Phos  3.3     02-20  Mg     2.3     02-20    TPro  7.3  /  Alb  3.8  /  TBili  1.3<H>  /  DBili  x   /  AST  201<H>  /  ALT  303<H>  /  AlkPhos  137<H>  02-20            Radiology Results      Meds    MEDICATIONS  (STANDING):  enoxaparin Injectable 40 milliGRAM(s) SubCutaneous daily  lactated ringers. 1000 milliLiter(s) (200 mL/Hr) IV Continuous <Continuous>  losartan 50 milliGRAM(s) Oral daily  pantoprazole    Tablet 40 milliGRAM(s) Oral before breakfast  simvastatin 40 milliGRAM(s) Oral at bedtime      MEDICATIONS  (PRN):  ketorolac   Injectable 15 milliGRAM(s) IV Push every 4 hours PRN Moderate Pain (4 - 6)  morphine  - Injectable 2 milliGRAM(s) IV Push every 4 hours PRN Severe Pain (7 - 10)  ondansetron Injectable 4 milliGRAM(s) IV Push every 4 hours PRN Nausea and/or Vomiting

## 2018-02-21 NOTE — PROGRESS NOTE ADULT - SUBJECTIVE AND OBJECTIVE BOX
[   ] ICU                                          [   ] CCU                                      [ X  ] Medical Floor    Patient is comfortable. No new complaints reported, Patient reports less abdominal pain but no N/V, hematemesis, hematochezia, melena, fever, chills, chest pain, SOB, cough or diarrhea.    VITALS  Vital Signs Last 24 Hrs  T(C): 36.9 (21 Feb 2018 15:39), Max: 36.9 (21 Feb 2018 08:11)  T(F): 98.5 (21 Feb 2018 15:39), Max: 98.5 (21 Feb 2018 08:11)  HR: 75 (21 Feb 2018 15:39) (68 - 77)  BP: 130/85 (21 Feb 2018 15:39) (121/68 - 151/88)   RR: 16 (21 Feb 2018 15:39) (16 - 18)  SpO2: 96% (21 Feb 2018 15:39) (96% - 100%)       MEDICATIONS  (STANDING):  enoxaparin Injectable 40 milliGRAM(s) SubCutaneous daily  lactated ringers. 1000 milliLiter(s) (200 mL/Hr) IV Continuous <Continuous>  losartan 50 milliGRAM(s) Oral daily  pantoprazole    Tablet 40 milliGRAM(s) Oral before breakfast    MEDICATIONS  (PRN):  ketorolac   Injectable 15 milliGRAM(s) IV Push every 4 hours PRN Moderate Pain (4 - 6)  morphine  - Injectable 2 milliGRAM(s) IV Push every 4 hours PRN Severe Pain (7 - 10)  ondansetron Injectable 4 milliGRAM(s) IV Push every 4 hours PRN Nausea and/or Vomiting                            13.6   3.7   )-----------( 141      ( 21 Feb 2018 06:14 )             42.1       02-21    143  |  107  |  7   ----------------------------<  90  3.4<L>   |  29  |  0.41<L>    Ca    8.4      21 Feb 2018 06:14  Phos  3.3     02-20  Mg     2.3     02-20    TPro  7.3  /  Alb  3.8  /  TBili  1.3<H>  /  DBili  x   /  AST  201<H>  /  ALT  303<H>  /  AlkPhos  137<H>  02-20      PT/INR - ( 20 Feb 2018 07:20 )   PT: 11.3 sec;   INR: 1.04 ratio         EXAM:  US LIVER AND PANCREAS                            PROCEDURE DATE:  02/20/2018          INTERPRETATION:  Right upper quadrant abdominal ultrasound    Indication: Right upper quadrant abdominal pain.    Right upper quadrant abdominal ultrasoundis performed without a previous   abdominal ultrasound for comparison. The liver spans 19.1 cm which is   enlarged. There is a 1.3 cm hepatic cyst.    Duplex Doppler interrogation of the main portal vein demonstrates normal   hepatopedal flow.    Status post cholecystectomy. Mild common bile duct dilatation at 8.7 mm   in caliber, probably due to post cholecystectomy status.    The pancreas is not well-visualized due to overlying bowel gas.    The right kidney measures 12.9 cm in length which is within normal limits   in size. The right kidney appears grossly unremarkable without   hydronephrosis.    No ascites is detected. IVC and aorta appear grossly unremarkable.    Impression: Status post cholecystectomy.    1.3 cm hepatic cyst.    Hepatomegaly.

## 2018-02-21 NOTE — PROGRESS NOTE ADULT - SUBJECTIVE AND OBJECTIVE BOX
pt seen in icu [  ], reg med floor [  x ], bed [ x ], chair at bedside [   ]  Awake, alert, comfortable OOB to chair in NAD. Still with abdominal pain  REVIEW OF SYSTEMS:    CONSTITUTIONAL: No weakness, fevers or chills  EYES/ENT: No visual changes;  No vertigo or throat pain   NECK: No pain or stiffness  RESPIRATORY: No cough, wheezing, hemoptysis; No shortness of breath  CARDIOVASCULAR: No chest pain or palpitations  GASTROINTESTINAL: + abdominal  epigastric pain. No nausea, vomiting, or hematemesis; No diarrhea or constipation. No melena or hematochezia.  GENITOURINARY: No dysuria, frequency or hematuria  NEUROLOGICAL: No numbness or weakness  SKIN: No itching, burning, rashes, or lesions   All other review of systems is negative unless indicated above.    Physical Exam    General: WN/WD NAD  Neurology: A&Ox3, nonfocal, NAILS x 4  Respiratory: CTA B/L  CV: RRR, S1S2, no murmurs, rubs or gallops  Abdominal: Soft, Tender on epigastrium, ND +BS, Last BM  Extremities: No edema, + peripheral pulses      Allergies  No Known Allergies      Health Issues  Acute pancreatitis without infection or necrosis  HLD (hyperlipidemia)  HTN (hypertension)  S/P shoulder surgery      Vitals  T(F): 98.5 (02-21-18 @ 08:11), Max: 99 (02-20-18 @ 15:48)  HR: 75 (02-21-18 @ 08:11) (68 - 95)  BP: 135/77 (02-21-18 @ 08:11) (120/84 - 151/88)  RR: 16 (02-21-18 @ 08:11) (16 - 18)  SpO2: 99% (02-21-18 @ 08:11) (97% - 100%)  Wt(kg): --  CAPILLARY BLOOD GLUCOSE          Labs                          13.6   3.7   )-----------( 141      ( 21 Feb 2018 06:14 )             42.1       02-21    143  |  107  |  7   ----------------------------<  90  3.4<L>   |  29  |  0.41<L>    Ca    8.4      21 Feb 2018 06:14  Phos  3.3     02-20  Mg     2.3     02-20    TPro  7.3  /  Alb  3.8  /  TBili  1.3<H>  /  DBili  x   /  AST  201<H>  /  ALT  303<H>  /  AlkPhos  137<H>  02-20            Radiology Results      Meds    MEDICATIONS  (STANDING):  enoxaparin Injectable 40 milliGRAM(s) SubCutaneous daily  lactated ringers. 1000 milliLiter(s) (200 mL/Hr) IV Continuous <Continuous>  losartan 50 milliGRAM(s) Oral daily  pantoprazole    Tablet 40 milliGRAM(s) Oral before breakfast  simvastatin 40 milliGRAM(s) Oral at bedtime      MEDICATIONS  (PRN):  ketorolac   Injectable 15 milliGRAM(s) IV Push every 4 hours PRN Moderate Pain (4 - 6)  morphine  - Injectable 2 milliGRAM(s) IV Push every 4 hours PRN Severe Pain (7 - 10)  ondansetron Injectable 4 milliGRAM(s) IV Push every 4 hours PRN Nausea and/or Vomiting

## 2018-02-22 PROCEDURE — 74183 MRI ABD W/O CNTR FLWD CNTR: CPT | Mod: 26

## 2018-02-22 RX ORDER — SODIUM CHLORIDE 9 MG/ML
1000 INJECTION, SOLUTION INTRAVENOUS
Refills: 0 | Status: COMPLETED | OUTPATIENT
Start: 2018-02-22 | End: 2018-02-23

## 2018-02-22 RX ADMIN — Medication 0.5 MILLIGRAM(S): at 09:55

## 2018-02-22 RX ADMIN — SODIUM CHLORIDE 100 MILLILITER(S): 9 INJECTION, SOLUTION INTRAVENOUS at 06:54

## 2018-02-22 RX ADMIN — SODIUM CHLORIDE 50 MILLILITER(S): 9 INJECTION, SOLUTION INTRAVENOUS at 22:49

## 2018-02-22 RX ADMIN — SODIUM CHLORIDE 100 MILLILITER(S): 9 INJECTION, SOLUTION INTRAVENOUS at 15:51

## 2018-02-22 RX ADMIN — PANTOPRAZOLE SODIUM 40 MILLIGRAM(S): 20 TABLET, DELAYED RELEASE ORAL at 06:54

## 2018-02-22 RX ADMIN — ENOXAPARIN SODIUM 40 MILLIGRAM(S): 100 INJECTION SUBCUTANEOUS at 11:32

## 2018-02-22 RX ADMIN — LOSARTAN POTASSIUM 50 MILLIGRAM(S): 100 TABLET, FILM COATED ORAL at 06:54

## 2018-02-22 RX ADMIN — Medication 15 MILLIGRAM(S): at 00:15

## 2018-02-22 NOTE — PROGRESS NOTE ADULT - GASTROINTESTINAL DETAILS
no distention/soft/nontender/no guarding/no rebound tenderness/no rigidity
no rigidity/no rebound tenderness/soft/bowel sounds normal/no guarding/nontender/no distention

## 2018-02-22 NOTE — PROGRESS NOTE ADULT - SUBJECTIVE AND OBJECTIVE BOX
[   ] ICU                                          [   ] CCU                                      [  X ] Medical Floor    Patient is comfortable. No new complaints reported, No abdominal pain, N/V, hematemesis, hematochezia, melena, fever, chills, chest pain, SOB, cough or diarrhea reported.    VITALS  Vital Signs Last 24 Hrs  T(C): 37 (22 Feb 2018 15:51), Max: 37 (22 Feb 2018 15:51)  T(F): 98.6 (22 Feb 2018 15:51), Max: 98.6 (22 Feb 2018 15:51)  HR: 88 (22 Feb 2018 15:51) (71 - 88)  BP: 138/87 (22 Feb 2018 15:51) (130/79 - 152/78)  BP(mean): --  RR: 16 (22 Feb 2018 15:51) (16 - 16)  SpO2: 96% (22 Feb 2018 15:51) (96% - 96%)       MEDICATIONS  (STANDING):  enoxaparin Injectable 40 milliGRAM(s) SubCutaneous daily  lactated ringers. 1000 milliLiter(s) (100 mL/Hr) IV Continuous <Continuous>  losartan 50 milliGRAM(s) Oral daily  pantoprazole    Tablet 40 milliGRAM(s) Oral before breakfast    MEDICATIONS  (PRN):  ketorolac   Injectable 15 milliGRAM(s) IV Push every 4 hours PRN Moderate Pain (4 - 6)  morphine  - Injectable 2 milliGRAM(s) IV Push every 4 hours PRN Severe Pain (7 - 10)  ondansetron Injectable 4 milliGRAM(s) IV Push every 4 hours PRN Nausea and/or Vomiting                            14.6   4.2   )-----------( 151      ( 22 Feb 2018 06:00 )             42.9       02-22    141  |  105  |  8   ----------------------------<  76  3.3<L>   |  28  |  0.41<L>    Ca    8.4      22 Feb 2018 06:00        EXAM:  MR MRCP [facility]                            PROCEDURE DATE:  02/22/2018          INTERPRETATION:  Abdominal MR without and with IV contrast including MRCP    Indication: Acute pancreatitis.    Technique: Utilizing a 1.5 Luisana high-field magnet, multiplanar   multisequence MR images of the abdomen are acquired without and with IV   contrast (7.5 cc Gadavist administered, 2.5 cc discarded), supplemented   by thin and thick slab MRCP images. Postcontrast images are acquired   dynamically.    Comparison: No prior abdominal MR is available for comparison.    Findings: The gallbladder is not visualized, probably surgically absent.   The common bile duct measures 1.0 cm in diameter which is mildly dilated.   No evidence for choledocholithiasis. The main pancreatic duct in the   pancreatic head is mildly dilated at 4.1 mm in diameter. Within the   second part of the duodenum in the region of the sphincter of Oddi, there   is a 1.1 cm soft tissue density structure which may represent a prominent   sphincter or an ampullary tumor protruding into the duodenal lumen.    2 hepatic cysts are seen measuring up to 1.0 cm. The hepatic vessels are   patent.    The pancreas appears unremarkable without evidence for peripancreatic   edema or fluid. There is normal enhancement of the pancreas without   evidence for necrosis.    The spleen, and adrenals appear unremarkable. Tiny renal cysts   bilaterally.    No evidence for ascites, or enlarged lymph node.     There is a hemangioma in T9 vertebra.    Impression: The gallbladder is not visualized, probably surgically absent.    No evidence for choledocholithiasis. Mild dilatation of the common bile   duct. The main pancreatic duct in the pancreatic head is mildly dilated   as well. Within thesecond part of the duodenum in the region of the   sphincter of Oddi, there is a 1.1 cm soft tissue density structure which   may represent a prominent sphincter or an obstructive ampullary tumor   protruding into the duodenal lumen. ERCP and endoscopic ultrasound may be   pursued for further evaluation.    No MR evidence for acute appendicitis.    Other findings as abov

## 2018-02-22 NOTE — PROGRESS NOTE ADULT - NEGATIVE ENMT SYMPTOMS
no dysphagia/no ear pain/no gum bleeding/no dry mouth/no throat pain/no hearing difficulty
no hearing difficulty/no dysphagia/no gum bleeding/no dry mouth/no throat pain/no ear pain

## 2018-02-22 NOTE — PROGRESS NOTE ADULT - RS GEN PE MLT RESP DETAILS PC
respirations non-labored/clear to auscultation bilaterally/breath sounds equal/airway patent/good air movement
breath sounds equal/good air movement/airway patent

## 2018-02-22 NOTE — PROGRESS NOTE ADULT - SUBJECTIVE AND OBJECTIVE BOX
Patient is a 62y old  Female who presents with a chief complaint of abdominal pain, nausea, vomiting (20 Feb 2018 02:10)  awake, alert, comfortable in bed in NAD    INTERVAL HPI/OVERNIGHT EVENTS:      VITAL SIGNS:  T(F): 98 (02-22-18 @ 08:13)  HR: 74 (02-22-18 @ 08:13)  BP: 130/79 (02-22-18 @ 08:13)  RR: 16 (02-22-18 @ 08:13)  SpO2: 96% (02-22-18 @ 08:13)  Wt(kg): --  I&O's Detail          REVIEW OF SYSTEMS:    CONSTITUTIONAL:  No fevers, chills, sweats    HEENT:  Eyes:  No diplopia or blurred vision. ENT:  No earache, sore throat or runny nose.    CARDIOVASCULAR:  No pressure, squeezing, tightness, or heaviness about the chest; no palpitations.    RESPIRATORY:  Per HPI    GASTROINTESTINAL:  No abdominal pain, nausea, vomiting or diarrhea.    GENITOURINARY:  No dysuria, frequency or urgency.    NEUROLOGIC:  No paresthesias, fasciculations, seizures or weakness.    PSYCHIATRIC:  No disorder of thought or mood.      PHYSICAL EXAM:    Constitutional: Well developed and nourished  Eyes:Perrla  ENMT: normal  Neck:supple  Respiratory: good air entry  Cardiovascular: S1 S2 regular  Gastrointestinal: Soft, Non tender  Extremities: No edema  Vascular:normal  Neurological:Awake, alert,Ox3  Musculoskeletal:Normal      MEDICATIONS  (STANDING):  enoxaparin Injectable 40 milliGRAM(s) SubCutaneous daily  lactated ringers. 1000 milliLiter(s) (100 mL/Hr) IV Continuous <Continuous>  losartan 50 milliGRAM(s) Oral daily  pantoprazole    Tablet 40 milliGRAM(s) Oral before breakfast    MEDICATIONS  (PRN):  ketorolac   Injectable 15 milliGRAM(s) IV Push every 4 hours PRN Moderate Pain (4 - 6)  morphine  - Injectable 2 milliGRAM(s) IV Push every 4 hours PRN Severe Pain (7 - 10)  ondansetron Injectable 4 milliGRAM(s) IV Push every 4 hours PRN Nausea and/or Vomiting      Allergies    No Known Allergies    Intolerances        LABS:                        14.6   4.2   )-----------( 151      ( 22 Feb 2018 06:00 )             42.9     02-22    141  |  105  |  8   ----------------------------<  76  3.3<L>   |  28  |  0.41<L>    Ca    8.4      22 Feb 2018 06:00                CAPILLARY BLOOD GLUCOSE            RADIOLOGY & ADDITIONAL TESTS:    CXR:    Ct scan chest:  < from: CT Abdomen and Pelvis w/ IV Cont (02.19.18 @ 22:58) >  IMPRESSION:    No definite acute abnormality.    Asymmetric left breast density. Correlate with mammogram.      < end of copied text >    ekg;    echo:

## 2018-02-22 NOTE — PROGRESS NOTE ADULT - PROBLEM SELECTOR PLAN 1
Clear liquid diet  GI eval  replace lytes Clear liquid diet and advance if tolerated  GI follow up  replace christine

## 2018-02-22 NOTE — PROGRESS NOTE ADULT - NEGATIVE MUSCULOSKELETAL SYMPTOMS
no muscle cramps/no arthralgia/no neck pain/no stiffness
no neck pain/no arthralgia/no stiffness/no muscle cramps

## 2018-02-22 NOTE — PROGRESS NOTE ADULT - NEGATIVE GASTROINTESTINAL SYMPTOMS
no diarrhea/no nausea/no vomiting/no hematochezia/no melena/no jaundice
no diarrhea/no vomiting/no hematochezia/no melena/no jaundice/no nausea

## 2018-02-22 NOTE — PROGRESS NOTE ADULT - SUBJECTIVE AND OBJECTIVE BOX
Patient is a 62y old  Female who presents with a chief complaint of abdominal pain, nausea, vomiting (20 Feb 2018 02:10)      Overnight Events: None, denies abdominal pain nor F/N/V/C/ SOB.    REVIEW OF SYSTEMS:    CONSTITUTIONAL: No weakness, fevers or chills  RESPIRATORY: No cough, wheezing, hemoptysis; No shortness of breath  CARDIOVASCULAR: No chest pain or palpitations  GASTROINTESTINAL: No abdominal or epigastric pain. No nausea, vomiting, or hematemesis; No diarrhea or constipation. No melena or hematochezia.  NEUROLOGICAL: No numbness or weakness  All other review of systems is negative unless indicated above.    MEDICATIONS  (STANDING):  enoxaparin Injectable 40 milliGRAM(s) SubCutaneous daily  lactated ringers. 1000 milliLiter(s) (100 mL/Hr) IV Continuous <Continuous>  losartan 50 milliGRAM(s) Oral daily  pantoprazole    Tablet 40 milliGRAM(s) Oral before breakfast    MEDICATIONS  (PRN):  ketorolac   Injectable 15 milliGRAM(s) IV Push every 4 hours PRN Moderate Pain (4 - 6)  morphine  - Injectable 2 milliGRAM(s) IV Push every 4 hours PRN Severe Pain (7 - 10)  ondansetron Injectable 4 milliGRAM(s) IV Push every 4 hours PRN Nausea and/or Vomiting    Vital Signs Last 24 Hrs  T(C): 36.7 (22 Feb 2018 08:13), Max: 36.9 (21 Feb 2018 15:39)  T(F): 98 (22 Feb 2018 08:13), Max: 98.5 (21 Feb 2018 15:39)  HR: 74 (22 Feb 2018 08:13) (71 - 75)  BP: 130/79 (22 Feb 2018 08:13) (130/79 - 152/78)  BP(mean): --  RR: 16 (22 Feb 2018 08:13) (16 - 16)  SpO2: 96% (22 Feb 2018 08:13) (96% - 96%)    General: WN/WD NAD  Neurology: A&Ox3  Respiratory: CTA B/L, no wheezes, no rhonchi, no rales  CV: RRR, S1S2, no murmur  Abdominal: Soft, NT, ND no palpable mass, bowel sounds positive  MSK: No edema, + peripheral pulses      Labs:                        14.6   4.2   )-----------( 151      ( 22 Feb 2018 06:00 )             42.9     02-22    141  |  105  |  8   ----------------------------<  76  3.3<L>   |  28  |  0.41<L>    Ca    8.4      22 Feb 2018 06:00      02-20 Chol 200<H>  HDL 55 Trig 175<H>, 02-19 Chol -- LDL -- HDL -- Trig 246<H>

## 2018-02-22 NOTE — PROGRESS NOTE ADULT - ASSESSMENT
1. Pancreatitis  2. R/o ampullary mass  3. Abdominal pain    Suggestions:    1. EGD  2. Protonix daily  3. Avoid NSAID  4. DVT prophylaxis
1. Pancreatitis  2. Elevated LFT's     3. R/o CBD stone    Suggestions:    1. Clear liquid diet  2. Protonix daily  3. Monitor electrolytes  4. MRCP  5. DVT prophylaxis  6. Follow up LFT's

## 2018-02-22 NOTE — PROGRESS NOTE ADULT - NEUROLOGICAL DETAILS
alert and oriented x 3/responds to verbal commands/sensation intact/responds to pain
alert and oriented x 3/responds to verbal commands/sensation intact/responds to pain

## 2018-02-22 NOTE — PROGRESS NOTE ADULT - NEGATIVE NEUROLOGICAL SYMPTOMS
no vertigo/no syncope/no tremors/no headache
no vertigo/no syncope/no tremors/no confusion/no hemiparesis/no headache

## 2018-02-23 ENCOUNTER — RESULT REVIEW (OUTPATIENT)
Age: 63
End: 2018-02-23

## 2018-02-23 PROCEDURE — 88312 SPECIAL STAINS GROUP 1: CPT | Mod: 26

## 2018-02-23 PROCEDURE — 88305 TISSUE EXAM BY PATHOLOGIST: CPT | Mod: 26

## 2018-02-23 RX ADMIN — Medication 15 MILLIGRAM(S): at 18:50

## 2018-02-23 RX ADMIN — Medication 15 MILLIGRAM(S): at 19:32

## 2018-02-23 RX ADMIN — PANTOPRAZOLE SODIUM 40 MILLIGRAM(S): 20 TABLET, DELAYED RELEASE ORAL at 06:02

## 2018-02-23 RX ADMIN — LOSARTAN POTASSIUM 50 MILLIGRAM(S): 100 TABLET, FILM COATED ORAL at 06:02

## 2018-02-23 RX ADMIN — SODIUM CHLORIDE 50 MILLILITER(S): 9 INJECTION, SOLUTION INTRAVENOUS at 11:22

## 2018-02-23 NOTE — DIETITIAN INITIAL EVALUATION ADULT. - NUTRITION INTERVENTION
Meals and Snack/Nutrition Education/Collaboration and Referral of Nutrition Care/Medical Food Supplements/Vitamin

## 2018-02-23 NOTE — PROGRESS NOTE ADULT - PROBLEM SELECTOR PLAN 1
MRCP results noted and discussed with GI  For EGD today and inspection of the ampulla for tumor  May need ERCP later

## 2018-02-23 NOTE — PROGRESS NOTE ADULT - PROBLEM SELECTOR PLAN 1
HI hinkle  replace christine  going for MRCP today NPO  GI eval  replace lytes  MRCP results reviewed.  Pt going for EGD today with Dr. Fink

## 2018-02-23 NOTE — DIETITIAN INITIAL EVALUATION ADULT. - MD RECOMMEND
po supplement/Advance diet with nutrition supplement as medically feasible, or consider alternate nutrition support if NPO prolonged

## 2018-02-23 NOTE — DIETITIAN INITIAL EVALUATION ADULT. - OTHER INFO
Patient seen for NPO >3days. Patient from home lives with family. Visited pt. reports appetite fair 2-3days with abdominal pain/nausea & vomiting but continued with eating small meals PTA, stated  Lbs >2yrs & having been gaining wt. but unable to give amounts but never attempted wt. loss but wants to manage her wt. once d/c & requested diet education, give copy of My Plate Planner with wt. loss tips in [place], receptive to information given, presently pt. awaiting EGD & NPO for test, skin intact. D/W RN.

## 2018-02-23 NOTE — PROGRESS NOTE ADULT - SUBJECTIVE AND OBJECTIVE BOX
Patient is a 62y old  Female who presents with a chief complaint of abdominal pain, nausea, vomiting (20 Feb 2018 02:10)  Awake, alert, comfortable in bed in NAD    INTERVAL HPI/OVERNIGHT EVENTS:      VITAL SIGNS:  T(F): 98.2 (02-23-18 @ 08:19)  HR: 64 (02-23-18 @ 08:19)  BP: 122/71 (02-23-18 @ 08:19)  RR: 16 (02-23-18 @ 08:19)  SpO2: 98% (02-23-18 @ 08:19)  Wt(kg): --  I&O's Detail          REVIEW OF SYSTEMS:    CONSTITUTIONAL:  No fevers, chills, sweats    HEENT:  Eyes:  No diplopia or blurred vision. ENT:  No earache, sore throat or runny nose.    CARDIOVASCULAR:  No pressure, squeezing, tightness, or heaviness about the chest; no palpitations.    RESPIRATORY:  Per HPI    GASTROINTESTINAL:  No abdominal pain, nausea, vomiting or diarrhea.    GENITOURINARY:  No dysuria, frequency or urgency.    NEUROLOGIC:  No paresthesias, fasciculations, seizures or weakness.    PSYCHIATRIC:  No disorder of thought or mood.      PHYSICAL EXAM:    Constitutional: Well developed and nourished  Eyes:Perrla  ENMT: normal  Neck:supple  Respiratory: good air entry  Cardiovascular: S1 S2 regular  Gastrointestinal: Soft, Non tender  Extremities: No edema  Vascular:normal  Neurological:Awake, alert,Ox3  Musculoskeletal:Normal      MEDICATIONS  (STANDING):  enoxaparin Injectable 40 milliGRAM(s) SubCutaneous daily  lactated ringers. 1000 milliLiter(s) (100 mL/Hr) IV Continuous <Continuous>  losartan 50 milliGRAM(s) Oral daily  pantoprazole    Tablet 40 milliGRAM(s) Oral before breakfast    MEDICATIONS  (PRN):  ketorolac   Injectable 15 milliGRAM(s) IV Push every 4 hours PRN Moderate Pain (4 - 6)  morphine  - Injectable 2 milliGRAM(s) IV Push every 4 hours PRN Severe Pain (7 - 10)  ondansetron Injectable 4 milliGRAM(s) IV Push every 4 hours PRN Nausea and/or Vomiting      Allergies    No Known Allergies    Intolerances        LABS:                        14.4   4.9   )-----------( 167      ( 23 Feb 2018 08:40 )             42.6     02-23    140  |  104  |  15  ----------------------------<  70  3.5   |  26  |  0.51    Ca    8.8      23 Feb 2018 08:40                CAPILLARY BLOOD GLUCOSE            RADIOLOGY & ADDITIONAL TESTS:  < from: MR MRCP w/wo IV Cont (02.22.18 @ 14:24) >  Impression: The gallbladder is not visualized, probably surgically absent.    No evidence for choledocholithiasis. Mild dilatation of the common bile   duct. The main pancreatic duct in the pancreatic head is mildly dilated   as well. Within thesecond part of the duodenum in the region of the   sphincter of Oddi, there is a 1.1 cm soft tissue density structure which   may represent a prominent sphincter or an obstructive ampullary tumor   protruding into the duodenal lumen. ERCP and endoscopic ultrasound may be   pursued for further evaluation.    No MR evidence for acute appendicitis.    Other findings as above.    < end of copied text >    CXR:    Ct scan chest:    ekg;    echo:

## 2018-02-23 NOTE — PROGRESS NOTE ADULT - SUBJECTIVE AND OBJECTIVE BOX
Patient is a 62y old  Female who presents with a chief complaint of abdominal pain, nausea, vomiting (20 Feb 2018 02:10)      Overnight Events: None    REVIEW OF SYSTEMS:    CONSTITUTIONAL: No weakness, fevers or chills  RESPIRATORY: No cough, wheezing, hemoptysis; No shortness of breath  CARDIOVASCULAR: No chest pain or palpitations  GASTROINTESTINAL: No abdominal or epigastric pain. No nausea, vomiting, or hematemesis; No diarrhea or constipation. No melena or hematochezia.  NEUROLOGICAL: No numbness or weakness  All other review of systems is negative unless indicated above.    MEDICATIONS  (STANDING):  enoxaparin Injectable 40 milliGRAM(s) SubCutaneous daily  lactated ringers. 1000 milliLiter(s) (100 mL/Hr) IV Continuous <Continuous>  losartan 50 milliGRAM(s) Oral daily  pantoprazole    Tablet 40 milliGRAM(s) Oral before breakfast    MEDICATIONS  (PRN):  ketorolac   Injectable 15 milliGRAM(s) IV Push every 4 hours PRN Moderate Pain (4 - 6)  morphine  - Injectable 2 milliGRAM(s) IV Push every 4 hours PRN Severe Pain (7 - 10)  ondansetron Injectable 4 milliGRAM(s) IV Push every 4 hours PRN Nausea and/or Vomiting    Vital Signs Last 24 Hrs  T(C): 36.8 (23 Feb 2018 08:19), Max: 37 (22 Feb 2018 15:51)  T(F): 98.2 (23 Feb 2018 08:19), Max: 98.6 (22 Feb 2018 15:51)  HR: 64 (23 Feb 2018 08:19) (64 - 88)  BP: 122/71 (23 Feb 2018 08:19) (122/71 - 138/87)  BP(mean): --  RR: 16 (23 Feb 2018 08:19) (16 - 16)  SpO2: 98% (23 Feb 2018 08:19) (96% - 98%)    General: WN/WD NAD  Neurology: A&Ox3  Respiratory: CTA B/L, no wheezes, no rhonchi, no rales  CV: RRR, S1S2, no murmur  Abdominal: Soft, NT, ND no palpable mass, bowel sounds positive  MSK: No edema, + peripheral pulses      Labs:                        14.4   4.9   )-----------( 167      ( 23 Feb 2018 08:40 )             42.6     02-23    140  |  104  |  15  ----------------------------<  70  3.5   |  26  |  0.51    Ca    8.8      23 Feb 2018 08:40      02-20 Chol 200<H>  HDL 55 Trig 175<H>, 02-19 Chol -- LDL -- HDL -- Trig 246<H> Patient is a 62y old  Female who presents with a chief complaint of abdominal pain, nausea, vomiting (20 Feb 2018 02:10)      Overnight Events: None. Pt denies discomfort or abdominal pain.     REVIEW OF SYSTEMS:    CONSTITUTIONAL: No weakness, fevers or chills  RESPIRATORY: No cough, wheezing, hemoptysis; No shortness of breath  CARDIOVASCULAR: No chest pain or palpitations  GASTROINTESTINAL: No abdominal or epigastric pain. No nausea, vomiting, or hematemesis; No diarrhea or constipation. No melena or hematochezia.  NEUROLOGICAL: No numbness or weakness  All other review of systems is negative unless indicated above.    MEDICATIONS  (STANDING):  enoxaparin Injectable 40 milliGRAM(s) SubCutaneous daily  lactated ringers. 1000 milliLiter(s) (100 mL/Hr) IV Continuous <Continuous>  losartan 50 milliGRAM(s) Oral daily  pantoprazole    Tablet 40 milliGRAM(s) Oral before breakfast    MEDICATIONS  (PRN):  ketorolac   Injectable 15 milliGRAM(s) IV Push every 4 hours PRN Moderate Pain (4 - 6)  morphine  - Injectable 2 milliGRAM(s) IV Push every 4 hours PRN Severe Pain (7 - 10)  ondansetron Injectable 4 milliGRAM(s) IV Push every 4 hours PRN Nausea and/or Vomiting    Vital Signs Last 24 Hrs  T(C): 36.8 (23 Feb 2018 08:19), Max: 37 (22 Feb 2018 15:51)  T(F): 98.2 (23 Feb 2018 08:19), Max: 98.6 (22 Feb 2018 15:51)  HR: 64 (23 Feb 2018 08:19) (64 - 88)  BP: 122/71 (23 Feb 2018 08:19) (122/71 - 138/87)  BP(mean): --  RR: 16 (23 Feb 2018 08:19) (16 - 16)  SpO2: 98% (23 Feb 2018 08:19) (96% - 98%)    General: WN/WD NAD  Neurology: A&Ox3  Respiratory: CTA B/L, no wheezes, no rhonchi, no rales  CV: RRR, S1S2, no murmur  Abdominal: Soft, NT, ND no palpable mass, bowel sounds positive  MSK: No edema, + peripheral pulses      Labs:                        14.4   4.9   )-----------( 167      ( 23 Feb 2018 08:40 )             42.6     02-23    140  |  104  |  15  ----------------------------<  70  3.5   |  26  |  0.51    Ca    8.8      23 Feb 2018 08:40      02-20 Chol 200<H>  HDL 55 Trig 175<H>, 02-19 Chol -- LDL -- HDL -- Trig 246<H>    < from: MR MRCP w/wo IV Cont (02.22.18 @ 14:24) >    EXAM:  MR MRCP [facility]                            PROCEDURE DATE:  02/22/2018          INTERPRETATION:  Abdominal MR without and with IV contrast including MRCP    Indication: Acute pancreatitis.    Technique: Utilizing a 1.5 Luisana high-field magnet, multiplanar   multisequence MR images of the abdomen are acquired without and with IV   contrast (7.5 cc Gadavist administered, 2.5 cc discarded), supplemented   by thin and thick slab MRCP images. Postcontrast images are acquired   dynamically.    Comparison: No prior abdominal MR is available for comparison.    Findings: The gallbladder is not visualized, probably surgically absent.   The common bile duct measures 1.0 cm in diameter which is mildly dilated.   No evidence for choledocholithiasis. The main pancreatic duct in the   pancreatic head is mildly dilated at 4.1 mm in diameter. Within the   second part of the duodenum in the region of the sphincter of Oddi, there   is a 1.1 cm soft tissue density structure which may represent a prominent   sphincter or an ampullary tumor protruding into the duodenal lumen.    2 hepatic cysts are seen measuring up to 1.0 cm. The hepatic vessels are   patent.    The pancreas appears unremarkable without evidence for peripancreatic   edema or fluid. There is normal enhancement of the pancreas without   evidence for necrosis.    The spleen, and adrenals appear unremarkable. Tiny renal cysts   bilaterally.    No evidence for ascites, or enlarged lymph node.     There is a hemangioma in T9 vertebra.    Impression: The gallbladder is not visualized, probably surgically absent.    No evidence for choledocholithiasis. Mild dilatation of the common bile   duct. The main pancreatic duct in the pancreatic head is mildly dilated   as well. Within thesecond part of the duodenum in the region of the   sphincter of Oddi, there is a 1.1 cm soft tissue density structure which   may represent a prominent sphincter or an obstructive ampullary tumor   protruding into the duodenal lumen. ERCP and endoscopic ultrasound may be   pursued for further evaluation.    No MR evidence for acute appendicitis.    Other findings as above.    < end of copied text >

## 2018-02-24 ENCOUNTER — TRANSCRIPTION ENCOUNTER (OUTPATIENT)
Age: 63
End: 2018-02-24

## 2018-02-24 VITALS
DIASTOLIC BLOOD PRESSURE: 84 MMHG | SYSTOLIC BLOOD PRESSURE: 150 MMHG | OXYGEN SATURATION: 96 % | TEMPERATURE: 98 F | RESPIRATION RATE: 17 BRPM | HEART RATE: 74 BPM

## 2018-02-24 RX ORDER — PANTOPRAZOLE SODIUM 20 MG/1
1 TABLET, DELAYED RELEASE ORAL
Qty: 14 | Refills: 0
Start: 2018-02-24 | End: 2018-03-09

## 2018-02-24 RX ADMIN — LOSARTAN POTASSIUM 50 MILLIGRAM(S): 100 TABLET, FILM COATED ORAL at 06:19

## 2018-02-24 RX ADMIN — PANTOPRAZOLE SODIUM 40 MILLIGRAM(S): 20 TABLET, DELAYED RELEASE ORAL at 06:19

## 2018-02-24 NOTE — DISCHARGE NOTE ADULT - CARE PLAN
Principal Discharge DX:	Acute pancreatitis  Goal:	complete resolution  Assessment and plan of treatment:	MRCP - The gallbladder is not visualized, probably surgically absent. No evidence for choledocholithiasis. Mild dilatation of the common bile duct. The main pancreatic duct in the pancreatic head is mildly dilated   as well. Within the second part of the duodenum in the region of the sphincter of Oddi, there is a 1.1 cm soft tissue density structure which may represent a prominent sphincter or an obstructive ampullary tumor protruding into the duodenal lumen.  follow with outpatient GI for ERCP with ultrasonic endoscopy to rule out Common bile duct tumor.  Secondary Diagnosis:	HLD (hyperlipidemia)  Assessment and plan of treatment:	well controlled during hosptial stay   continue home medications  Secondary Diagnosis:	HTN (hypertension)  Assessment and plan of treatment:	well controlled during hosptial stay   continue home medications

## 2018-02-24 NOTE — PROGRESS NOTE ADULT - PROBLEM SELECTOR PROBLEM 3
HLD (hyperlipidemia)

## 2018-02-24 NOTE — DISCHARGE NOTE ADULT - MEDICATION SUMMARY - MEDICATIONS TO TAKE
I will START or STAY ON the medications listed below when I get home from the hospital:    losartan 25 mg oral tablet  -- 1 tab(s) by mouth once a day  -- Indication: For HTN (hypertension)    simvastatin 20 mg oral tablet  -- 1 tab(s) by mouth once a day (at bedtime)  -- Indication: For HLD (hyperlipidemia)

## 2018-02-24 NOTE — PROGRESS NOTE ADULT - PROBLEM SELECTOR PLAN 2
monitor BP  cont meds

## 2018-02-24 NOTE — PROGRESS NOTE ADULT - PROBLEM SELECTOR PROBLEM 1
Acute pancreatitis without infection or necrosis, unspecified pancreatitis type

## 2018-02-24 NOTE — PROGRESS NOTE ADULT - PROVIDER SPECIALTY LIST ADULT
Gastroenterology
Gastroenterology
Internal Medicine
Pulmonology

## 2018-02-24 NOTE — DISCHARGE NOTE ADULT - CARE PROVIDER_API CALL
Duy Fink), Medicine  [address] [place] [address]  Phone: [phone]  Fax: [phone]

## 2018-02-24 NOTE — PROGRESS NOTE ADULT - PROBLEM SELECTOR PLAN 1
NPO  GI eval  replace lytes  MRCP results reviewed.  Pt going for EGD today with Dr. Fink cont meds  GI follow up as OP  replace christine  MRCP results reviewed.  Stable for DC home

## 2018-02-24 NOTE — DISCHARGE NOTE ADULT - PATIENT PORTAL LINK FT
You can access the [facility] Patient Portal, offered by [facility], by registering with the following website: [email or web address]

## 2018-02-24 NOTE — PROGRESS NOTE ADULT - SUBJECTIVE AND OBJECTIVE BOX
pt seen in icu [  ], reg med floor [ x ], bed [ x ], chair at bedside [   ]  Pt alert, awake, oriented x3, resting comfortable in NAD.  REVIEW OF SYSTEMS:    CONSTITUTIONAL: No weakness, fevers or chills  EYES/ENT: No visual changes;  No vertigo or throat pain   NECK: No pain or stiffness  RESPIRATORY: No cough, wheezing, hemoptysis; No shortness of breath  CARDIOVASCULAR: No chest pain or palpitations  GASTROINTESTINAL: No abdominal or epigastric pain. No nausea, vomiting, or hematemesis; No diarrhea or constipation. No melena or hematochezia.  GENITOURINARY: No dysuria, frequency or hematuria  NEUROLOGICAL: No numbness or weakness  SKIN: No itching, burning, rashes, or lesions   All other review of systems is negative unless indicated above.    Physical Exam    General: WN/WD NAD  Neurology: A&Ox3, nonfocal, NAILS x 4  Respiratory: CTA B/L  CV: RRR, S1S2, no murmurs, rubs or gallops  Abdominal: Soft, NT, ND +BS, Last BM  Extremities: No edema, + peripheral pulses      Allergies  No Known Allergies      Health Issues  Acute pancreatitis without infection or necrosis  HLD (hyperlipidemia)  HTN (hypertension)  S/P shoulder surgery      Vitals  T(F): 97.9 (02-24-18 @ 08:06), Max: 98.6 (02-23-18 @ 16:23)  HR: 87 (02-24-18 @ 08:06) (67 - 87)  BP: 125/89 (02-24-18 @ 08:06) (125/89 - 134/84)  RR: 16 (02-24-18 @ 08:06) (16 - 16)  SpO2: 97% (02-24-18 @ 08:06) (95% - 99%)  Wt(kg): --  CAPILLARY BLOOD GLUCOSE          Labs                          15.3   6.2   )-----------( 206      ( 24 Feb 2018 07:17 )             48.0       02-24    140  |  104  |  16  ----------------------------<  102<H>  3.6   |  26  |  0.57    Ca    8.7      24 Feb 2018 07:17              Radiology Results  < from: Xray Chest 2 Views PA/Lat (10.06.13 @ 00:24) >  FINDINGS:  The lungsare clear.   There are no pleural effusions or pneumothorax.  The cardiomediastinal structures are within normal limits.  The visualized osseous structures are unremarkable.    IMPRESSION: Clear lungs.    < end of copied text >      Meds    MEDICATIONS  (STANDING):  enoxaparin Injectable 40 milliGRAM(s) SubCutaneous daily  lactated ringers. 1000 milliLiter(s) (100 mL/Hr) IV Continuous <Continuous>  losartan 50 milliGRAM(s) Oral daily  pantoprazole    Tablet 40 milliGRAM(s) Oral before breakfast      MEDICATIONS  (PRN):  ketorolac   Injectable 15 milliGRAM(s) IV Push every 4 hours PRN Moderate Pain (4 - 6)  morphine  - Injectable 2 milliGRAM(s) IV Push every 4 hours PRN Severe Pain (7 - 10)  ondansetron Injectable 4 milliGRAM(s) IV Push every 4 hours PRN Nausea and/or Vomiting pt seen in icu [  ], reg med floor [ x ], bed [ x ], chair at bedside [   ]  Pt alert, awake, oriented x3, resting comfortable in NAD. S/p EGD. No evidence of ampullary mass. Will need ERCP with EUS as OP as per GI  REVIEW OF SYSTEMS:    CONSTITUTIONAL: No weakness, fevers or chills  EYES/ENT: No visual changes;  No vertigo or throat pain   NECK: No pain or stiffness  RESPIRATORY: No cough, wheezing, hemoptysis; No shortness of breath  CARDIOVASCULAR: No chest pain or palpitations  GASTROINTESTINAL: No abdominal or epigastric pain. No nausea, vomiting, or hematemesis; No diarrhea or constipation. No melena or hematochezia.  GENITOURINARY: No dysuria, frequency or hematuria  NEUROLOGICAL: No numbness or weakness  SKIN: No itching, burning, rashes, or lesions   All other review of systems is negative unless indicated above.    Physical Exam    General: WN/WD NAD  Neurology: A&Ox3, nonfocal, NAILS x 4  Respiratory: CTA B/L  CV: RRR, S1S2, no murmurs, rubs or gallops  Abdominal: Soft, NT, ND +BS, Last BM  Extremities: No edema, + peripheral pulses      Allergies  No Known Allergies      Health Issues  Acute pancreatitis without infection or necrosis  HLD (hyperlipidemia)  HTN (hypertension)  S/P shoulder surgery      Vitals  T(F): 97.9 (02-24-18 @ 08:06), Max: 98.6 (02-23-18 @ 16:23)  HR: 87 (02-24-18 @ 08:06) (67 - 87)  BP: 125/89 (02-24-18 @ 08:06) (125/89 - 134/84)  RR: 16 (02-24-18 @ 08:06) (16 - 16)  SpO2: 97% (02-24-18 @ 08:06) (95% - 99%)  Wt(kg): --  CAPILLARY BLOOD GLUCOSE          Labs                          15.3   6.2   )-----------( 206      ( 24 Feb 2018 07:17 )             48.0       02-24    140  |  104  |  16  ----------------------------<  102<H>  3.6   |  26  |  0.57    Ca    8.7      24 Feb 2018 07:17              Radiology Results  < from: Xray Chest 2 Views PA/Lat (10.06.13 @ 00:24) >  FINDINGS:  The lungsare clear.   There are no pleural effusions or pneumothorax.  The cardiomediastinal structures are within normal limits.  The visualized osseous structures are unremarkable.    IMPRESSION: Clear lungs.    < end of copied text >      Meds    MEDICATIONS  (STANDING):  enoxaparin Injectable 40 milliGRAM(s) SubCutaneous daily  lactated ringers. 1000 milliLiter(s) (100 mL/Hr) IV Continuous <Continuous>  losartan 50 milliGRAM(s) Oral daily  pantoprazole    Tablet 40 milliGRAM(s) Oral before breakfast      MEDICATIONS  (PRN):  ketorolac   Injectable 15 milliGRAM(s) IV Push every 4 hours PRN Moderate Pain (4 - 6)  morphine  - Injectable 2 milliGRAM(s) IV Push every 4 hours PRN Severe Pain (7 - 10)  ondansetron Injectable 4 milliGRAM(s) IV Push every 4 hours PRN Nausea and/or Vomiting

## 2018-02-24 NOTE — PROGRESS NOTE ADULT - PROBLEM SELECTOR PLAN 3
Hold statin due to LFTs elevation  low fat diet
Hold stating due to LFTs elevation

## 2018-02-24 NOTE — DISCHARGE NOTE ADULT - HOSPITAL COURSE
HPI:  63 Y/O F, Ethiopian speaking, from home, PMhx of HTN, HLD, gastritis, breast fibroadenoma, PShx of cholecystectomy, shoulder surgery, some bladder surgery , came with worsening abdominal pain since last 2 days. Pain is in epigastric region, no radiation, associated with nausea, vomiting 4 times ( clear), burning, no radiation, aggravating factor. Patient reports that since last 2 days she is unable to eat due to that pain. Reports that she ate [place] pizza before having the pain. Had normal colonoscopy , EGD about 2 yrs ago. Got mammogram every 6 months due to fibro fatty tissue. Denies any chest pain, urinary or bowel complains, fever.     Allergies: NKDA  ShxL: Non-smoker, non-alcoholic, no substance abuse   Fhx: Mother had stroke, father DM.     In the ED initial vitals shows BP of 169/103, labs with lipase of >4000, , AST 97, ALT 97, UA negative, CXR with some atelectasis CT scan abdomen with small hypodensity, minimal ductal dilatation breast density, got 1 L NS bolus, morphine 4 mg x 2, Zofran, ECG with NSR at 84 bpm, mild LVH,  admitted to medicine for pancreatitis. (20 Feb 2018 02:10)    hospital course:   MRCP - The gallbladder is not visualized, probably surgically absent. No evidence for choledocholithiasis. Mild dilatation of the common bile duct. The main pancreatic duct in the pancreatic head is mildly dilated   as well. Within the second part of the duodenum in the region of the sphincter of Oddi, there is a 1.1 cm soft tissue density structure which may represent a prominent sphincter or an obstructive ampullary tumor protruding into the duodenal lumen.  patient advised to followup with outpatient  GI for ERCP with ultrasonic endoscopy to rule out Common bile duct tumor.  patient is hemodynamically stable and ready for discharge.

## 2018-02-24 NOTE — DISCHARGE NOTE ADULT - PLAN OF CARE
complete resolution MRCP - The gallbladder is not visualized, probably surgically absent. No evidence for choledocholithiasis. Mild dilatation of the common bile duct. The main pancreatic duct in the pancreatic head is mildly dilated   as well. Within the second part of the duodenum in the region of the sphincter of Oddi, there is a 1.1 cm soft tissue density structure which may represent a prominent sphincter or an obstructive ampullary tumor protruding into the duodenal lumen.  follow with outpatient GI for ERCP with ultrasonic endoscopy to rule out Common bile duct tumor. well controlled during hosptial stay   continue home medications

## 2018-02-27 LAB — SURGICAL PATHOLOGY FINAL REPORT - CH: SIGNIFICANT CHANGE UP

## 2018-03-16 ENCOUNTER — EMERGENCY (EMERGENCY)
Facility: HOSPITAL | Age: 63
LOS: 1 days | Discharge: HOME | End: 2018-03-16
Attending: EMERGENCY MEDICINE
Payer: COMMERCIAL

## 2018-03-16 VITALS
RESPIRATION RATE: 16 BRPM | DIASTOLIC BLOOD PRESSURE: 89 MMHG | TEMPERATURE: 98 F | SYSTOLIC BLOOD PRESSURE: 142 MMHG | HEART RATE: 84 BPM | OXYGEN SATURATION: 95 %

## 2018-03-16 LAB
APPEARANCE UR: CLEAR — SIGNIFICANT CHANGE UP
BILIRUB UR-MCNC: ABNORMAL
COLOR SPEC: ABNORMAL
DIFF PNL FLD: ABNORMAL
GLUCOSE UR QL: NEGATIVE — SIGNIFICANT CHANGE UP
KETONES UR-MCNC: NEGATIVE — SIGNIFICANT CHANGE UP
LEUKOCYTE ESTERASE UR-ACNC: ABNORMAL
NITRITE UR-MCNC: POSITIVE
PH UR: 5 — SIGNIFICANT CHANGE UP (ref 5–8)
PROT UR-MCNC: 30 MG/DL
SP GR SPEC: 1.02 — SIGNIFICANT CHANGE UP (ref 1.01–1.02)
UROBILINOGEN FLD QL: 8

## 2018-03-16 PROCEDURE — 99283 EMERGENCY DEPT VISIT LOW MDM: CPT | Mod: 25

## 2018-03-16 RX ORDER — IBUPROFEN 200 MG
800 TABLET ORAL ONCE
Refills: 0 | Status: COMPLETED | OUTPATIENT
Start: 2018-03-16 | End: 2018-03-16

## 2018-03-16 RX ORDER — AZTREONAM 2 G
1 VIAL (EA) INJECTION
Qty: 14 | Refills: 0
Start: 2018-03-16 | End: 2018-03-22

## 2018-03-16 RX ADMIN — Medication 800 MILLIGRAM(S): at 02:46

## 2018-03-16 RX ADMIN — Medication 1 TABLET(S): at 04:19

## 2018-03-16 NOTE — ED PROVIDER NOTE - OBJECTIVE STATEMENT
61 y/o M pt w/ PMHx of HTN and HLD, presents to ED c/o dysuria and suprapubic pain. Pt reports onset x 2 weeks ago, was seen by GYN and given abx (unknown name, finished 2 days ago) and Pyridium which she is still taking. However, sx have not improved so pt came in for evaluation. Pain is non-radiating. Pt denies any fever, vomiting, diarrhea, or any other complaints. NKDA.

## 2018-03-16 NOTE — ED PROVIDER NOTE - PROGRESS NOTE DETAILS
UA cw UTI-given po bactrim  discussed above with pt. patient stable for discharge. Pt reports she will schedule appt with urologist tomorrow.

## 2018-03-19 ENCOUNTER — RESULT REVIEW (OUTPATIENT)
Age: 63
End: 2018-03-19

## 2019-02-22 ENCOUNTER — APPOINTMENT (OUTPATIENT)
Dept: UROLOGY | Facility: CLINIC | Age: 64
End: 2019-02-22
Payer: COMMERCIAL

## 2019-02-22 VITALS
HEIGHT: 61 IN | BODY MASS INDEX: 41.54 KG/M2 | WEIGHT: 220 LBS | SYSTOLIC BLOOD PRESSURE: 146 MMHG | DIASTOLIC BLOOD PRESSURE: 78 MMHG

## 2019-02-22 DIAGNOSIS — Z86.79 PERSONAL HISTORY OF OTHER DISEASES OF THE CIRCULATORY SYSTEM: ICD-10-CM

## 2019-02-22 DIAGNOSIS — Z78.9 OTHER SPECIFIED HEALTH STATUS: ICD-10-CM

## 2019-02-22 DIAGNOSIS — Z86.39 PERSONAL HISTORY OF OTHER ENDOCRINE, NUTRITIONAL AND METABOLIC DISEASE: ICD-10-CM

## 2019-02-22 PROCEDURE — 99204 OFFICE O/P NEW MOD 45 MIN: CPT

## 2019-02-22 RX ORDER — LOSARTAN POTASSIUM 100 MG/1
TABLET, FILM COATED ORAL
Refills: 0 | Status: ACTIVE | COMMUNITY

## 2019-02-22 RX ORDER — SIMVASTATIN 80 MG/1
TABLET, FILM COATED ORAL
Refills: 0 | Status: ACTIVE | COMMUNITY

## 2019-02-22 NOTE — ASSESSMENT
[FreeTextEntry1] : Very pleasant 63-year-old female with microscopic hematuria, recurrent UTIs, dyspareunia\par -urinalysis\par -urine culture\par -CT Urogram\par -BMP\par -cystoscopy\par -Extensive discussion of the potential etiologies of microscopic hematuria, as well as the need to complete full work up.  Patient understands and wishes to proceed.\par -Discussed general preventive strategies for urinary tract infection\par -Start Estrace vaginal cream\par -We discussed the risks of using any estrogen based cream\par

## 2019-02-22 NOTE — HISTORY OF PRESENT ILLNESS
[FreeTextEntry1] : Very pleasant 63 year -year old woman who presents for evaluation of microscopic hematuria found on urinalysis multiple times in the past. She reports no history of gross hematuria.  No flank pain. No suprapubic pain. No dysuria.  No urinary frequency, urgency. Reports a history of urinary tract infections 5-6 times per year over the last few years. No history of renal impairment. No aggravating or alleviating factors that she knows of contributing to hematuria or UTIs. She also reports dyspareunia.\par \par No family history of  malignancy.  No family history of nephrolithiasis. Does not smoke. No family history of gynecologic malignancy.\par  [Urinary Retention] : no urinary retention [Urinary Urgency] : no urinary urgency [Urinary Frequency] : no urinary frequency [Straining] : no straining [Dysuria] : no dysuria [Hematuria - Gross] : no gross hematuria [Hematuria - Microscopic] : microscopic hematuria [Bladder Spasm] : no bladder spasm [Abdominal Pain] : no abdominal pain [Flank Pain] : no flank pain [Fever] : no fever [Fatigue] : no fatigue [Nausea] : no nausea [Anorexia] : no anorexia

## 2019-02-26 LAB
ANION GAP SERPL CALC-SCNC: 17 MMOL/L
BACTERIA UR CULT: NORMAL
BUN SERPL-MCNC: 15 MG/DL
CALCIUM SERPL-MCNC: 9.5 MG/DL
CHLORIDE SERPL-SCNC: 104 MMOL/L
CO2 SERPL-SCNC: 22 MMOL/L
CREAT SERPL-MCNC: 0.51 MG/DL
GLUCOSE SERPL-MCNC: 97 MG/DL
POTASSIUM SERPL-SCNC: 4.3 MMOL/L
SODIUM SERPL-SCNC: 143 MMOL/L

## 2019-03-05 ENCOUNTER — APPOINTMENT (OUTPATIENT)
Dept: UROLOGY | Facility: CLINIC | Age: 64
End: 2019-03-05
Payer: COMMERCIAL

## 2019-03-05 VITALS
BODY MASS INDEX: 41.54 KG/M2 | HEIGHT: 61 IN | SYSTOLIC BLOOD PRESSURE: 126 MMHG | TEMPERATURE: 98.7 F | DIASTOLIC BLOOD PRESSURE: 68 MMHG | WEIGHT: 220 LBS

## 2019-03-05 DIAGNOSIS — N94.10 UNSPECIFIED DYSPAREUNIA: ICD-10-CM

## 2019-03-05 LAB
APPEARANCE: CLEAR
BACTERIA: NEGATIVE
BILIRUBIN URINE: NEGATIVE
BLOOD URINE: ABNORMAL
COLOR: YELLOW
GLUCOSE QUALITATIVE U: NEGATIVE
HYALINE CASTS: 0 /LPF
KETONES URINE: NEGATIVE
LEUKOCYTE ESTERASE URINE: NEGATIVE
MICROSCOPIC-UA: NORMAL
NITRITE URINE: NEGATIVE
PH URINE: 6.5
PROTEIN URINE: NEGATIVE
RED BLOOD CELLS URINE: 6 /HPF
SPECIFIC GRAVITY URINE: 1.02
SQUAMOUS EPITHELIAL CELLS: 4 /HPF
UROBILINOGEN URINE: NORMAL
WHITE BLOOD CELLS URINE: 1 /HPF

## 2019-03-05 PROCEDURE — 99213 OFFICE O/P EST LOW 20 MIN: CPT | Mod: 25

## 2019-03-05 PROCEDURE — 52000 CYSTOURETHROSCOPY: CPT

## 2019-03-05 NOTE — ASSESSMENT
[FreeTextEntry1] : Very pleasant 63-year-old woman who presents for followup of recurrent urinary tract infections, dyspareunia, vaginal dryness, microscopic hematuria\par -Patient still needs CT urogram\par -Lab results reviewed demonstrating 6 red blood cells per high power field\par -I encouraged her to try Estrace vaginal cream, and we again discussed the risks of the medication\par -Followup after CT

## 2019-03-05 NOTE — HISTORY OF PRESENT ILLNESS
[FreeTextEntry1] : Very pleasant 63-year-old woman who presents for followup of microscopic hematuria, dyspareunia, recurrent urinary tract infections. At last visit, patient was prescribed Estrace vaginal cream, however she has not used the medication yet. Today cystoscopy demonstrates no urothelial lesions. She denies dysuria. No hematuria. No flank pain or suprapubic pain. No complaints. [Urinary Retention] : no urinary retention [Urinary Urgency] : no urinary urgency [Urinary Frequency] : no urinary frequency [Straining] : no straining [Dysuria] : no dysuria [Hematuria - Gross] : no gross hematuria [Hematuria - Microscopic] : microscopic hematuria [Bladder Spasm] : no bladder spasm [Abdominal Pain] : no abdominal pain [Flank Pain] : no flank pain [Fever] : no fever [Fatigue] : no fatigue [Nausea] : no nausea [Anorexia] : no anorexia

## 2019-03-12 ENCOUNTER — APPOINTMENT (OUTPATIENT)
Dept: CT IMAGING | Facility: HOSPITAL | Age: 64
End: 2019-03-12
Payer: COMMERCIAL

## 2019-03-12 ENCOUNTER — OUTPATIENT (OUTPATIENT)
Dept: OUTPATIENT SERVICES | Facility: HOSPITAL | Age: 64
LOS: 1 days | End: 2019-03-12
Payer: COMMERCIAL

## 2019-03-12 DIAGNOSIS — R31.29 OTHER MICROSCOPIC HEMATURIA: ICD-10-CM

## 2019-03-12 PROCEDURE — 74178 CT ABD&PLV WO CNTR FLWD CNTR: CPT | Mod: 26

## 2019-03-15 ENCOUNTER — EMERGENCY (EMERGENCY)
Facility: HOSPITAL | Age: 64
LOS: 1 days | Discharge: HOME | End: 2019-03-15
Attending: EMERGENCY MEDICINE | Admitting: EMERGENCY MEDICINE
Payer: COMMERCIAL

## 2019-03-15 VITALS — HEIGHT: 63 IN | WEIGHT: 149.91 LBS

## 2019-03-15 VITALS
SYSTOLIC BLOOD PRESSURE: 154 MMHG | HEART RATE: 89 BPM | RESPIRATION RATE: 18 BRPM | OXYGEN SATURATION: 97 % | DIASTOLIC BLOOD PRESSURE: 92 MMHG

## 2019-03-15 DIAGNOSIS — Z90.49 ACQUIRED ABSENCE OF OTHER SPECIFIED PARTS OF DIGESTIVE TRACT: Chronic | ICD-10-CM

## 2019-03-15 LAB
ACETONE SERPL-MCNC: NEGATIVE — SIGNIFICANT CHANGE UP
ALBUMIN SERPL ELPH-MCNC: 3.6 G/DL — SIGNIFICANT CHANGE UP (ref 3.5–5)
ALP SERPL-CCNC: 81 U/L — SIGNIFICANT CHANGE UP (ref 40–120)
ALT FLD-CCNC: 37 U/L DA — SIGNIFICANT CHANGE UP (ref 10–60)
ANION GAP SERPL CALC-SCNC: 4 MMOL/L — LOW (ref 5–17)
APTT BLD: 34.8 SEC — SIGNIFICANT CHANGE UP (ref 27.5–36.3)
AST SERPL-CCNC: 22 U/L — SIGNIFICANT CHANGE UP (ref 10–40)
BASOPHILS # BLD AUTO: 0.03 K/UL — SIGNIFICANT CHANGE UP (ref 0–0.2)
BASOPHILS NFR BLD AUTO: 0.5 % — SIGNIFICANT CHANGE UP (ref 0–2)
BILIRUB SERPL-MCNC: 0.3 MG/DL — SIGNIFICANT CHANGE UP (ref 0.2–1.2)
BUN SERPL-MCNC: 13 MG/DL — SIGNIFICANT CHANGE UP (ref 7–18)
CALCIUM SERPL-MCNC: 8.3 MG/DL — LOW (ref 8.4–10.5)
CHLORIDE SERPL-SCNC: 108 MMOL/L — SIGNIFICANT CHANGE UP (ref 96–108)
CK SERPL-CCNC: 77 U/L — SIGNIFICANT CHANGE UP (ref 21–215)
CO2 SERPL-SCNC: 28 MMOL/L — SIGNIFICANT CHANGE UP (ref 22–31)
CREAT SERPL-MCNC: 0.8 MG/DL — SIGNIFICANT CHANGE UP (ref 0.5–1.3)
EOSINOPHIL # BLD AUTO: 0.15 K/UL — SIGNIFICANT CHANGE UP (ref 0–0.5)
EOSINOPHIL NFR BLD AUTO: 2.3 % — SIGNIFICANT CHANGE UP (ref 0–6)
GLUCOSE SERPL-MCNC: 125 MG/DL — HIGH (ref 70–99)
HCT VFR BLD CALC: 45 % — SIGNIFICANT CHANGE UP (ref 34.5–45)
HGB BLD-MCNC: 14.5 G/DL — SIGNIFICANT CHANGE UP (ref 11.5–15.5)
IMM GRANULOCYTES NFR BLD AUTO: 0.3 % — SIGNIFICANT CHANGE UP (ref 0–1.5)
INR BLD: 0.99 RATIO — SIGNIFICANT CHANGE UP (ref 0.88–1.16)
LYMPHOCYTES # BLD AUTO: 1.49 K/UL — SIGNIFICANT CHANGE UP (ref 1–3.3)
LYMPHOCYTES # BLD AUTO: 22.9 % — SIGNIFICANT CHANGE UP (ref 13–44)
MAGNESIUM SERPL-MCNC: 2.2 MG/DL — SIGNIFICANT CHANGE UP (ref 1.6–2.6)
MCHC RBC-ENTMCNC: 28.2 PG — SIGNIFICANT CHANGE UP (ref 27–34)
MCHC RBC-ENTMCNC: 32.2 GM/DL — SIGNIFICANT CHANGE UP (ref 32–36)
MCV RBC AUTO: 87.5 FL — SIGNIFICANT CHANGE UP (ref 80–100)
MONOCYTES # BLD AUTO: 0.43 K/UL — SIGNIFICANT CHANGE UP (ref 0–0.9)
MONOCYTES NFR BLD AUTO: 6.6 % — SIGNIFICANT CHANGE UP (ref 2–14)
NEUTROPHILS # BLD AUTO: 4.4 K/UL — SIGNIFICANT CHANGE UP (ref 1.8–7.4)
NEUTROPHILS NFR BLD AUTO: 67.4 % — SIGNIFICANT CHANGE UP (ref 43–77)
NRBC # BLD: 0 /100 WBCS — SIGNIFICANT CHANGE UP (ref 0–0)
PLATELET # BLD AUTO: 205 K/UL — SIGNIFICANT CHANGE UP (ref 150–400)
POTASSIUM SERPL-MCNC: 3.6 MMOL/L — SIGNIFICANT CHANGE UP (ref 3.5–5.3)
POTASSIUM SERPL-SCNC: 3.6 MMOL/L — SIGNIFICANT CHANGE UP (ref 3.5–5.3)
PROT SERPL-MCNC: 7.5 G/DL — SIGNIFICANT CHANGE UP (ref 6–8.3)
PROTHROM AB SERPL-ACNC: 11 SEC — SIGNIFICANT CHANGE UP (ref 10–12.9)
RBC # BLD: 5.14 M/UL — SIGNIFICANT CHANGE UP (ref 3.8–5.2)
RBC # FLD: 12.4 % — SIGNIFICANT CHANGE UP (ref 10.3–14.5)
SODIUM SERPL-SCNC: 140 MMOL/L — SIGNIFICANT CHANGE UP (ref 135–145)
TROPONIN I SERPL-MCNC: <0.015 NG/ML — SIGNIFICANT CHANGE UP (ref 0–0.04)
WBC # BLD: 6.52 K/UL — SIGNIFICANT CHANGE UP (ref 3.8–10.5)
WBC # FLD AUTO: 6.52 K/UL — SIGNIFICANT CHANGE UP (ref 3.8–10.5)

## 2019-03-15 PROCEDURE — 99284 EMERGENCY DEPT VISIT MOD MDM: CPT

## 2019-03-15 PROCEDURE — 70450 CT HEAD/BRAIN W/O DYE: CPT | Mod: 26

## 2019-03-15 RX ORDER — ACETAMINOPHEN 500 MG
1000 TABLET ORAL ONCE
Refills: 0 | Status: COMPLETED | OUTPATIENT
Start: 2019-03-15 | End: 2019-03-15

## 2019-03-15 RX ADMIN — Medication 400 MILLIGRAM(S): at 17:07

## 2019-03-15 NOTE — ED ADULT NURSE NOTE - NSIMPLEMENTINTERV_GEN_ALL_ED
Implemented All Universal Safety Interventions:  [place] to call system. Call bell, personal items and telephone within reach. Instruct patient to call for assistance. Room bathroom lighting operational. Non-slip footwear when patient is off stretcher. Physically safe environment: no spills, clutter or unnecessary equipment. Stretcher in lowest position, wheels locked, appropriate side rails in place.

## 2019-03-15 NOTE — ED PROVIDER NOTE - ENMT, MLM
Airway patent, Nasal mucosa clear. Mouth with normal mucosa. Throat has no vesicles, no oropharyngeal exudates and uvula is midline.  Sinus- NT to percussion

## 2019-03-15 NOTE — ED PROVIDER NOTE - PROGRESS NOTE DETAILS
Pt claims feeling much better, want to go home, pt also has a neurologist, advised to f/u as outpt.  Pt is ambulating without diff.

## 2019-03-15 NOTE — ED PROVIDER NOTE - OBJECTIVE STATEMENT
64 y/o F patient with a significant PMHx of HLD, HTN, and a significant PSHx of Cholecystectomy, and right shoulder surgery presents to the ED with a headache for x8 days. Patient describes her headache as constant pressure localized to the frontal and right side of the head. Patient also notes experiencing x1 episode of vertigo x3 days ago. Patient endorses photophobia, feeling flushed, and a burning sensation to the left eye. Patient denies blurred vision, nausea, vomiting, fever, and any other complaints. Patient adds she took Motrin to mild relief. Patient notes recently traveling back from [place] and arriving back to [place] on 3/9/19. NKDA.

## 2019-03-15 NOTE — ED ADULT NURSE NOTE - OBJECTIVE STATEMENT
Patient came to the ED a/o x 3 ambulates c/o headaches x 1 week. Pt denies any blurry vision, no CP noted.

## 2019-03-22 ENCOUNTER — APPOINTMENT (OUTPATIENT)
Dept: UROLOGY | Facility: CLINIC | Age: 64
End: 2019-03-22
Payer: COMMERCIAL

## 2019-03-22 VITALS
HEART RATE: 85 BPM | DIASTOLIC BLOOD PRESSURE: 81 MMHG | BODY MASS INDEX: 41.54 KG/M2 | WEIGHT: 220 LBS | SYSTOLIC BLOOD PRESSURE: 126 MMHG | HEIGHT: 61 IN

## 2019-03-22 PROCEDURE — 99214 OFFICE O/P EST MOD 30 MIN: CPT

## 2019-03-22 NOTE — PHYSICAL EXAM

## 2019-03-22 NOTE — HISTORY OF PRESENT ILLNESS
[FreeTextEntry1] : Very pleasant 63-year-old woman who presents for followup of microscopic hematuria and nocturia. Patient recently underwent CT scan which demonstrated no abnormalities. She reports nocturia x2-3. No dysuria. No flank pain or suprapubic pain. No other complaints. [Urinary Retention] : no urinary retention [Urinary Urgency] : no urinary urgency [Urinary Frequency] : no urinary frequency [Nocturia] : nocturia [Straining] : no straining [Dysuria] : no dysuria [Hematuria - Gross] : no gross hematuria [Hematuria - Microscopic] : microscopic hematuria [Bladder Spasm] : no bladder spasm [Abdominal Pain] : no abdominal pain [Flank Pain] : no flank pain [Fever] : no fever [Fatigue] : no fatigue [Nausea] : no nausea [Anorexia] : no anorexia

## 2019-03-22 NOTE — ASSESSMENT
[FreeTextEntry1] : Very pleasant 63-year-old woman who presents for followup of microscopic hematuria and nocturia\par -CT images reviewed-no abnormalities\par -Prior labs reviewed with the patient again\par -Elevate legs prior to bedtime\par -Stop drinking fluids prior to bedtime\par -Follow up in 6 months

## 2019-06-03 ENCOUNTER — APPOINTMENT (OUTPATIENT)
Dept: PULMONOLOGY | Facility: CLINIC | Age: 64
End: 2019-06-03
Payer: COMMERCIAL

## 2019-06-03 VITALS
WEIGHT: 232 LBS | DIASTOLIC BLOOD PRESSURE: 82 MMHG | TEMPERATURE: 98.6 F | HEIGHT: 61 IN | BODY MASS INDEX: 43.8 KG/M2 | HEART RATE: 89 BPM | SYSTOLIC BLOOD PRESSURE: 121 MMHG | RESPIRATION RATE: 16 BRPM

## 2019-06-03 PROCEDURE — 94729 DIFFUSING CAPACITY: CPT

## 2019-06-03 PROCEDURE — 94727 GAS DIL/WSHOT DETER LNG VOL: CPT

## 2019-06-03 PROCEDURE — 99203 OFFICE O/P NEW LOW 30 MIN: CPT | Mod: 25

## 2019-06-03 PROCEDURE — 94060 EVALUATION OF WHEEZING: CPT

## 2019-06-04 NOTE — HISTORY OF PRESENT ILLNESS
[FreeTextEntry1] : 63 year old woman with HTN, HLD\par \par She presents due to abnormal CXR, suggesting interstitial lung disease.  She reports that she has had  a severe URI 2 months ago.  CXR revealed increased basilar lung markings as discussed.  \par \par Took antibiotic for 2 weeks.  She is better but still has some cough with some white phlegm.. She has mild dyspnea with exertion, and fatigue.  She notes dyspnea every time she has URI.  She notes improvement when she uses bronchodilator, usually via nebulizer.\par \par She denies rash, arthralgia, difficulty swallowing.\par \par She sleeps on her side.  she admits to snoring  No daytime somnolence\par \par She also sometimes has lower extremity edema.\par \par \par She denies any heart problems. \par \par She has had GERD in past\par \par CXR 5/3/ 2019 reveals increased lung markings of lungs suggesting chronic interstitial lung disease\par \par \par PSH\par right shoulder surgery\par appendectomy\par uterine prolapse\par \par \par MEDS:\par losartan\par simvastatin\par gabapentin for pelvic pain\par \par \par SH\par \par non smoker\par \par  smokes heavily\par works as home attendant\par \par lives in apartment\par no mold\par \par no pets\par \par \par ALLERGY\par \par NKDA\par \par \par no allergy\par ROS\par \par no weight loss\par \par

## 2019-06-04 NOTE — REASON FOR VISIT
[Consultation] : a consultation visit [FreeTextEntry1] : abnormal CXR, interstitial lung disease, recent URI 2 months ago.

## 2019-06-04 NOTE — PHYSICAL EXAM
[Low Lying Soft Palate] : low lying soft palate [Enlarged Base of the Tongue] : enlargement of the base of the tongue [Heart Sounds] : normal S1 and S2 [Auscultation Breath Sounds / Voice Sounds] : lungs were clear to auscultation bilaterally [Motor Exam] : the motor exam was normal [Affect] : the affect was normal [Mood] : the mood was normal [Normal Oropharynx] : abnormal oropharynx [Elongated Uvula] : no elongated uvula [Erythema] : no erythema of the pharynx [FreeTextEntry1] : short thick [FreeTextEntry2] : no edema

## 2019-06-04 NOTE — PROCEDURE
[FreeTextEntry1] : PFT\par \par normal spirometry\par borderline diminished lung volume\par normal DLCO\par \par [facility]

## 2019-06-28 ENCOUNTER — APPOINTMENT (OUTPATIENT)
Dept: UROLOGY | Facility: CLINIC | Age: 64
End: 2019-06-28
Payer: COMMERCIAL

## 2019-06-28 ENCOUNTER — MEDICATION RENEWAL (OUTPATIENT)
Age: 64
End: 2019-06-28

## 2019-06-28 VITALS
HEART RATE: 86 BPM | SYSTOLIC BLOOD PRESSURE: 140 MMHG | BODY MASS INDEX: 43.05 KG/M2 | RESPIRATION RATE: 17 BRPM | DIASTOLIC BLOOD PRESSURE: 88 MMHG | WEIGHT: 228 LBS | HEIGHT: 61 IN

## 2019-06-28 PROCEDURE — 99214 OFFICE O/P EST MOD 30 MIN: CPT

## 2019-06-28 NOTE — HISTORY OF PRESENT ILLNESS
[FreeTextEntry1] : Very pleasant 63-year-old woman who presents for followup of microscopic hematuria, urinary urgency, nocturia. Patient was previously taking VESIcare and she reported significant improvement in her urinary symptoms on the medication. She reports decrease in nocturia from 4-5 times per night to 2 times per night. She denies dysuria. No hematuria. No flank pain or suprapubic pain. She reports increased daytime urinary urgency which is also relieved by VESIcare. No specific timing to her symptoms. No aggravating factors that she knows of. [Urinary Retention] : no urinary retention [Urinary Urgency] : urinary urgency [Urinary Frequency] : no urinary frequency [Nocturia] : nocturia [Straining] : no straining [Dysuria] : no dysuria [Hematuria - Gross] : no gross hematuria [Hematuria - Microscopic] : microscopic hematuria [Bladder Spasm] : no bladder spasm [Abdominal Pain] : no abdominal pain [Flank Pain] : no flank pain [Fever] : no fever [Fatigue] : no fatigue [Nausea] : no nausea [Anorexia] : no anorexia

## 2019-06-28 NOTE — ASSESSMENT
[FreeTextEntry1] : Very pleasant 63-year-old woman who presents for followup of urinary urgency, nocturia, recurrent urinary tract infections\par -We will reports chronic VESIcare given that she believes this helped her in the past\par -Urine culture\par -We discussed general preventive strategies for urinary tract infections\par -Follow up in 6 month\par -Prior labs reviewed

## 2019-07-01 LAB — BACTERIA UR CULT: NORMAL

## 2019-07-18 RX ORDER — SIMVASTATIN 20 MG/1
1 TABLET, FILM COATED ORAL
Qty: 0 | Refills: 0 | DISCHARGE

## 2019-07-18 RX ORDER — LOSARTAN POTASSIUM 100 MG/1
1 TABLET, FILM COATED ORAL
Qty: 0 | Refills: 0 | DISCHARGE

## 2019-07-19 ENCOUNTER — OUTPATIENT (OUTPATIENT)
Dept: OUTPATIENT SERVICES | Facility: HOSPITAL | Age: 64
LOS: 1 days | End: 2019-07-19
Payer: COMMERCIAL

## 2019-07-19 ENCOUNTER — APPOINTMENT (OUTPATIENT)
Dept: CT IMAGING | Facility: HOSPITAL | Age: 64
End: 2019-07-19
Payer: COMMERCIAL

## 2019-07-19 DIAGNOSIS — J84.9 INTERSTITIAL PULMONARY DISEASE, UNSPECIFIED: ICD-10-CM

## 2019-07-19 DIAGNOSIS — Z90.49 ACQUIRED ABSENCE OF OTHER SPECIFIED PARTS OF DIGESTIVE TRACT: Chronic | ICD-10-CM

## 2019-07-19 PROCEDURE — 71250 CT THORAX DX C-: CPT

## 2019-07-19 PROCEDURE — 71250 CT THORAX DX C-: CPT | Mod: 26

## 2019-09-06 ENCOUNTER — APPOINTMENT (OUTPATIENT)
Dept: PULMONOLOGY | Facility: CLINIC | Age: 64
End: 2019-09-06
Payer: COMMERCIAL

## 2019-09-06 VITALS
SYSTOLIC BLOOD PRESSURE: 120 MMHG | DIASTOLIC BLOOD PRESSURE: 80 MMHG | BODY MASS INDEX: 43.8 KG/M2 | OXYGEN SATURATION: 95 % | HEIGHT: 61 IN | RESPIRATION RATE: 16 BRPM | HEART RATE: 85 BPM | WEIGHT: 232 LBS | TEMPERATURE: 97.7 F

## 2019-09-06 PROCEDURE — 99214 OFFICE O/P EST MOD 30 MIN: CPT

## 2019-09-07 NOTE — PHYSICAL EXAM
[Low Lying Soft Palate] : low lying soft palate [Enlarged Base of the Tongue] : enlargement of the base of the tongue [Heart Sounds] : normal S1 and S2 [Auscultation Breath Sounds / Voice Sounds] : lungs were clear to auscultation bilaterally [Affect] : the affect was normal [Motor Exam] : the motor exam was normal [Mood] : the mood was normal [Normal Oropharynx] : abnormal oropharynx [Elongated Uvula] : no elongated uvula [Erythema] : no erythema of the pharynx [FreeTextEntry1] : unchanged [Bowel Sounds] : normal bowel sounds [Abdomen Soft] : soft [Abdomen Tenderness] : non-tender [] : no hepato-splenomegaly [FreeTextEntry2] : no edema

## 2019-09-07 NOTE — PROCEDURE
[FreeTextEntry1] :  EXAM:  CT CHEST                      \par \par \par PROCEDURE DATE:  07/19/2019  \par \par \par \par INTERPRETATION:  \par \par CLINICAL INFORMATION:  Interstitial lung disease\par \par PROCEDURE:  Using multislice helical CT, thin axial sections were \par obtained from the thoracic inlet through the lung bases. Sagittal and \par coronal reconstructions were performed.\par \par COMPARISON: None available\par \par FINDINGS:  \par \par There is no significant axillary, hilar, or mediastinal lymphadenopathy. \par There is no pleural or pericardial effusion.\par \par There is no evidence of infiltrate, mass or nodule.\par \par The osseous structures are remarkable for hemangioma within the T9 \par vertebral body.\par \par The upper abdomen is remarkable for a small hypodensity in the liver \par suggestive of a small cyst in the left lobe similar to the prior study.\par \par IMPRESSION: Unremarkable study. No CT evidence of interstitial lung \par disease.\par \par \par \par \par \par \par \par \par \par \par \par \par ABIDA CHEN M.D.,ATTENDING RADIOLOGIST\par This document has been electronically signed. Jul 19 2019  2:42PM\par   \par \par   \par \par

## 2019-09-07 NOTE — HISTORY OF PRESENT ILLNESS
[FreeTextEntry1] : 63 year old woman with HTN, HLD\par \par She presented due to abnormal CXR, suggesting interstitial lung disease.    CXR revealed increased basilar lung markings as discussed.  \par \par CT chest was performed July 2019 that ruled out interstitial lung disease.\par \par She notes dyspnea every time she has URI.  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

## 2019-09-07 NOTE — REVIEW OF SYSTEMS
[Dry Mouth] : dry mouth [As Noted in HPI] : as noted in HPI [Frequent URIs] : no frequent upper respiratory infections

## 2019-09-07 NOTE — REASON FOR VISIT
[Follow-Up] : a follow-up visit [FreeTextEntry1] : possible interstitial lung disease, snoring possible sleep apnea

## 2019-09-07 NOTE — DISCUSSION/SUMMARY
[FreeTextEntry1] : INterstitial lung disease ruled out by CT chest\par \par PFT normal\par \par possible sleep apnea\par \par \par PLAN\par \par Order overnight sleep study\par \par Rome Balderas MD [facility] \par \par

## 2019-10-14 ENCOUNTER — EMERGENCY (EMERGENCY)
Facility: HOSPITAL | Age: 64
LOS: 1 days | Discharge: HOME | End: 2019-10-14
Attending: EMERGENCY MEDICINE
Payer: COMMERCIAL

## 2019-10-14 VITALS
DIASTOLIC BLOOD PRESSURE: 81 MMHG | TEMPERATURE: 98 F | OXYGEN SATURATION: 96 % | WEIGHT: 235.01 LBS | HEART RATE: 89 BPM | RESPIRATION RATE: 20 BRPM | SYSTOLIC BLOOD PRESSURE: 138 MMHG | HEIGHT: 61 IN

## 2019-10-14 VITALS
OXYGEN SATURATION: 95 % | TEMPERATURE: 98 F | SYSTOLIC BLOOD PRESSURE: 129 MMHG | HEART RATE: 76 BPM | RESPIRATION RATE: 18 BRPM | DIASTOLIC BLOOD PRESSURE: 75 MMHG

## 2019-10-14 DIAGNOSIS — Z90.49 ACQUIRED ABSENCE OF OTHER SPECIFIED PARTS OF DIGESTIVE TRACT: Chronic | ICD-10-CM

## 2019-10-14 LAB
ALBUMIN SERPL ELPH-MCNC: 3.7 G/DL — SIGNIFICANT CHANGE UP (ref 3.5–5)
ALP SERPL-CCNC: 71 U/L — SIGNIFICANT CHANGE UP (ref 40–120)
ALT FLD-CCNC: 49 U/L DA — SIGNIFICANT CHANGE UP (ref 10–60)
ANION GAP SERPL CALC-SCNC: 3 MMOL/L — LOW (ref 5–17)
AST SERPL-CCNC: 25 U/L — SIGNIFICANT CHANGE UP (ref 10–40)
BILIRUB SERPL-MCNC: 0.3 MG/DL — SIGNIFICANT CHANGE UP (ref 0.2–1.2)
BUN SERPL-MCNC: 10 MG/DL — SIGNIFICANT CHANGE UP (ref 7–18)
CALCIUM SERPL-MCNC: 8.5 MG/DL — SIGNIFICANT CHANGE UP (ref 8.4–10.5)
CHLORIDE SERPL-SCNC: 109 MMOL/L — HIGH (ref 96–108)
CO2 SERPL-SCNC: 28 MMOL/L — SIGNIFICANT CHANGE UP (ref 22–31)
CREAT SERPL-MCNC: 0.78 MG/DL — SIGNIFICANT CHANGE UP (ref 0.5–1.3)
GLUCOSE SERPL-MCNC: 140 MG/DL — HIGH (ref 70–99)
HCT VFR BLD CALC: 43.6 % — SIGNIFICANT CHANGE UP (ref 34.5–45)
HGB BLD-MCNC: 14 G/DL — SIGNIFICANT CHANGE UP (ref 11.5–15.5)
MAGNESIUM SERPL-MCNC: 2.3 MG/DL — SIGNIFICANT CHANGE UP (ref 1.6–2.6)
MCHC RBC-ENTMCNC: 28.6 PG — SIGNIFICANT CHANGE UP (ref 27–34)
MCHC RBC-ENTMCNC: 32.1 GM/DL — SIGNIFICANT CHANGE UP (ref 32–36)
MCV RBC AUTO: 89 FL — SIGNIFICANT CHANGE UP (ref 80–100)
NRBC # BLD: 0 /100 WBCS — SIGNIFICANT CHANGE UP (ref 0–0)
PLATELET # BLD AUTO: 176 K/UL — SIGNIFICANT CHANGE UP (ref 150–400)
POTASSIUM SERPL-MCNC: 4 MMOL/L — SIGNIFICANT CHANGE UP (ref 3.5–5.3)
POTASSIUM SERPL-SCNC: 4 MMOL/L — SIGNIFICANT CHANGE UP (ref 3.5–5.3)
PROT SERPL-MCNC: 7.3 G/DL — SIGNIFICANT CHANGE UP (ref 6–8.3)
RBC # BLD: 4.9 M/UL — SIGNIFICANT CHANGE UP (ref 3.8–5.2)
RBC # FLD: 12.5 % — SIGNIFICANT CHANGE UP (ref 10.3–14.5)
SODIUM SERPL-SCNC: 140 MMOL/L — SIGNIFICANT CHANGE UP (ref 135–145)
TROPONIN I SERPL-MCNC: <0.015 NG/ML — SIGNIFICANT CHANGE UP (ref 0–0.04)
WBC # BLD: 6.84 K/UL — SIGNIFICANT CHANGE UP (ref 3.8–10.5)
WBC # FLD AUTO: 6.84 K/UL — SIGNIFICANT CHANGE UP (ref 3.8–10.5)

## 2019-10-14 PROCEDURE — 93005 ELECTROCARDIOGRAM TRACING: CPT

## 2019-10-14 PROCEDURE — 70450 CT HEAD/BRAIN W/O DYE: CPT

## 2019-10-14 PROCEDURE — 85027 COMPLETE CBC AUTOMATED: CPT

## 2019-10-14 PROCEDURE — 96375 TX/PRO/DX INJ NEW DRUG ADDON: CPT

## 2019-10-14 PROCEDURE — 71046 X-RAY EXAM CHEST 2 VIEWS: CPT | Mod: 26

## 2019-10-14 PROCEDURE — 36415 COLL VENOUS BLD VENIPUNCTURE: CPT

## 2019-10-14 PROCEDURE — 96374 THER/PROPH/DIAG INJ IV PUSH: CPT

## 2019-10-14 PROCEDURE — 99284 EMERGENCY DEPT VISIT MOD MDM: CPT | Mod: 25

## 2019-10-14 PROCEDURE — 82962 GLUCOSE BLOOD TEST: CPT

## 2019-10-14 PROCEDURE — 80053 COMPREHEN METABOLIC PANEL: CPT

## 2019-10-14 PROCEDURE — 84484 ASSAY OF TROPONIN QUANT: CPT

## 2019-10-14 PROCEDURE — 71046 X-RAY EXAM CHEST 2 VIEWS: CPT

## 2019-10-14 PROCEDURE — 70450 CT HEAD/BRAIN W/O DYE: CPT | Mod: 26

## 2019-10-14 PROCEDURE — 83735 ASSAY OF MAGNESIUM: CPT

## 2019-10-14 PROCEDURE — 99284 EMERGENCY DEPT VISIT MOD MDM: CPT

## 2019-10-14 RX ORDER — KETOROLAC TROMETHAMINE 30 MG/ML
15 SYRINGE (ML) INJECTION ONCE
Refills: 0 | Status: DISCONTINUED | OUTPATIENT
Start: 2019-10-14 | End: 2019-10-14

## 2019-10-14 RX ORDER — METOCLOPRAMIDE HCL 10 MG
10 TABLET ORAL ONCE
Refills: 0 | Status: COMPLETED | OUTPATIENT
Start: 2019-10-14 | End: 2019-10-14

## 2019-10-14 RX ORDER — SODIUM CHLORIDE 9 MG/ML
1000 INJECTION INTRAMUSCULAR; INTRAVENOUS; SUBCUTANEOUS ONCE
Refills: 0 | Status: COMPLETED | OUTPATIENT
Start: 2019-10-14 | End: 2019-10-14

## 2019-10-14 RX ADMIN — Medication 104 MILLIGRAM(S): at 22:41

## 2019-10-14 RX ADMIN — Medication 15 MILLIGRAM(S): at 22:41

## 2019-10-14 RX ADMIN — SODIUM CHLORIDE 1000 MILLILITER(S): 9 INJECTION INTRAMUSCULAR; INTRAVENOUS; SUBCUTANEOUS at 22:41

## 2019-10-14 NOTE — ED PROVIDER NOTE - OBJECTIVE STATEMENT
64 y/o F pt with PMHx of HTN, HLD, and obesity, and PSHx of R shoulder surgery and cholecystectomy presents to ED c/o R sided head pain and body pain x3 days, with associated decreased sensation on R side of face. Pt relates that the pain is intermittent, but worsening today. Pt denies photophobia, visual changes, ear pain, chest pain, shortness of breath, abdominal pain, dysuria, hematuria, other weaknesses, and any other acute complaints.

## 2019-10-14 NOTE — ED ADULT NURSE NOTE - NSIMPLEMENTINTERV_GEN_ALL_ED
Implemented All Universal Safety Interventions:  [place] to call system. Call bell, personal items and telephone within reach. Instruct patient to call for assistance. Room bathroom lighting operational. Non-slip footwear when patient is off stretcher. Physically safe environment: no spills, clutter or unnecessary equipment. Stretcher in lowest position, wheels locked, appropriate side rails in place.

## 2019-10-14 NOTE — ED PROVIDER NOTE - CHPI ED SYMPTOMS NEG
no photophobia, no ear pain, no visual changes, no chest pain, no shortness of breath, no abdominal pain, no dysuria, no hematuria

## 2019-10-14 NOTE — ED PROVIDER NOTE - NSFOLLOWUPINSTRUCTIONS_ED_ALL_ED_FT
You were seen today for your head pain. Your lab work, EKG, and CT head did not show acute abnormalities. Please follow up at your scheduled neurologist appointment. You can take pain medications as needed twice a day for your head pain. Please return to the Emergency Department for worsening signs or symptoms.    Te vieron hoy por tu dolor de dao. Aldana trabajo de laboratorio, EKG y TC no mostraron anormalidades agudas. Bozena un seguimiento en aldana brynn programada para neurólogos. Puede lorena medicamentos para el dolor según sea necesario dos veces al día para el dolor de dao. Regrese al Departamento de emergencias para empeorar los signos o síntomas. You were seen today for your head pain. Your lab work, EKG, and CT head did not show acute abnormalities. Please follow up at your scheduled neurologist appointment. You can take pain medications as needed twice a day for your head pain. Please return to the Emergency Department for worsening signs or symptoms.    Te vieron hoy por tu dolor de dao. Aldana trabajo de laboratorio, EKG y TC no mostraron anormalidades agudas. Estevan un seguimiento en aldana brynn programada para neurólogos. Puede lorena medicamentos para el dolor según sea necesario dos veces al día para el dolor de dao. Regrese al Departamento de emergencias para empeorar los signos o síntomas.    Dolor de dao regular    CUIDADO AMBULATORIO:    El dolor de cabezapuede ser leve o intenso. Las causas comunes incluyen el estrés, medicamentos y lesiones en la dao. Problemas de sueño, alergias y cambios hormonales también pueden causar olga de dao. Puede que usted sufra de olga de dao frecuentes sin que se conozca aldana causa. Es probable que el dolor empiece en otra parte de aldana cuerpo y pase a aldana dao. El dolor de dao también puede moverse hacia otras partes de aldana cuerpo. Un dolor de dao puede causar otros síntomas, alessandra náusea y vómito. Un dolor de dao jm puede incluso ser higinio señal de derrame cerebral u otro problema madelaine que necesita de tratamiento inmediato.    Llame al 911 en wilmer de presentar lo siguiente:    Usted tiene alguno de los siguientes signos de derrame cerebral:   Adormecimiento o caída de un lado de aldana lupillo      Debilidad en un brazo o higinio pierna      Confusión o debilidad para hablar      Mareos o dolor de dao intenso, o pérdida de la visión.        Busque atención médica de inmediato si:    Usted tiene un dolor de dao con rigidez de enrique y fiebre.      Usted tiene un dolor de dao sherlyn y está vomitando.      Usted tiene dolor severo que no se faby después de lorena elisa medicamentos para el dolor.      Usted tiene dolor de dao y el dolor empeora cuando usted jaki hacia la errol.      Usted tiene dolor de dao y cambios en la vista, alessandra visión borrosa.      Usted tiene dolor de dao y está olvidadizo o confundido.    Comuníquese con aldana médico si:    Usted tiene dolor de dao todos los días y no se faby aun después de tratarlo.      Elisa olga de dao cambian u ocurren nuevos síntomas cuando tiene dolor de dao.      Otras personas con las que usted vive o trabaja también tienen olga de dao.      Usted tiene preguntas o inquietudes acerca de aldana condición o cuidado.    El tratamientopodría incluir cualquiera de los siguientes:     Los medicamentosPueden darse medicamentospara prevenir o tratar el dolor de dao. No espere hasta que el dolor sea severo para lorena aldana medicamento. Pregunte al médico cómo debe lorena francesca medicamento de forma peterson.      Los NATE,alessandra el ibuprofeno, ayudan a disminuir la inflamación, el dolor y la fiebre. Francesca medicamento está disponible con o sin higinio receta médica. Los NATE pueden causar sangrado estomacal o problemas renales en ciertas personas. Si usted esta tomando un anticoágulante, siempre pregunte si los AINEs son seguros para usted. Siempre kayli la etiqueta de francesca medicamento y siga las instrucciones. No administre francesca medicamento a niños menores de 6 meses de mya sin antes obtener la autorización de aldana médico.      Acetaminofénalivia el dolor y baja la fiebre. Está disponible sin receta médica. Pregunte la cantidad y la frecuencia con que debe tomarlos. Siga las indicaciones. Kayli las etiquetas de todos los demás medicamentos que esté usando para saber si también contienen acetaminofén, o pregunte a aldana médico o farmacéutico. El acetaminofén puede causar daño en el hígado cuando no se clint de forma correcta. No use más de 4 gramos (4000 miligramos) en total de acetaminofeno en un día.      Los medicamentos contra las náuseaspueden darse para calmar aldana estómago y ayudar a prevenir los vómitos.    El manejo de elisa síntomas:    Descanse en higinio habitación oscura y tranquila.[place] alessandra consecuencia podría ayudar a disminuir aldana dolor.      Aplique calor o hielo según lo indicado.El calor o el hielo pueden ayudar a disminuir el dolor o los espasmos musculares. Aplíquese calor o hielo en el área por 20 minutos cada 2 horas por tantos días alessandra se lo recomienden. Es probable que aldana médico le recomiende que alterne entre calor y hielo.      Relaje elisa músculos para ayudar a aliviar aldana dolor de dao.Acuéstese en higinio posición cómoda y cierre elisa ojos. Relaje elisa músculos lentamente. Comience por los dedos de los pies y avance hacia arriba al madeline de aldana cuerpo. Un masaje o baño en agua tibia también pueden ayudar a relajar elisa músculos.    Mantenga un registro de elisa olga de dao:Escriba en el diario las fechas y las horas en que usted sufre un dolor de dao y lo que estaba haciendo antes de que empezara. Registre también lo que comió y bebió celestina las 24 horas previas a que comenzara el dolor de dao. [place] puede ayudar a aldana médico a encontrar la causa de elisa olga de dao y así poder crear un plan de tratamiento. Elisa anotaciones también pueden ayudarle a evitar lo que provoca elisa olga de dao o manejar elisa síntomas.    Duerma suficiente:Usted debería dormir entre 8 y 10 horas cada noche. Establezca un horario para dormir. Acuéstese y levántese a la misma hora todos los días. Puede resultarle útil hacer algo relajante antes de acostarse. No kvng televisión antes de acostarse.    No fume:La nicotina y otras sustancias químicas en los cigarrillos y cigarros pueden provocar un dolor de dao tensional o empeorarlo. Pida información a aldana médico si usted actualmente fuma y necesita ayuda para dejar de fumar. Los cigarrillos electrónicos o el tabaco sin humo igualmente contienen nicotina. Consulte con aldana médico antes de utilizar estos productos.    [place] líquidos según elisa indicaciones:Es probable que usted necesite lorena más líquido para prevenir la deshidratación. La deshidratación puede causar olga de dao. Pregunte a aldana médico sobre la cantidad de líquido que necesita lorena todos los phil y cuáles le recomienda.    Limite la cafeína y las bebidas alcohólicas según se le haya indicado:Elisa olga de dao pueden ser causados por la cafeína o el alcohol. Es probable que usted también desarrolle un dolor de dao si clint cafeína regularmente y america de tomarla repentinamente.    Consuma alimentos saludables y variados:No se salte ninguna comida. [place] demasiado poco puede causar un dolor de dao. Incluya frutas, vegetales, panes integrales, productos lácteos bajos en grasas, frijoles, lynnette magras y pescado. No coma alimentos que provoquen elisa olga de dao, alessandra chocolate y vino tinto. Alimentos que contienen gluten, nitratos, monosodio glutamato (MSG) o azúcares artificiales también pueden llegar a causar un dolor de dao.    Acuda a elisa consultas de control con aldana médico según le indicaron.Anote elisa preguntas para que se acuerde de hacerlas celestina elisa visitas    Parestesia  Paresthesia  La parestesia es higinio sensación de ardor o picazón. Esta sensación puede aparecer en cualquier parte del cuerpo. Suele manifestarse en las tai, los brazos, las piernas o los pies. Por lo general, no es dolorosa. En la mayoría de los casos, la sensación desaparece al poco tiempo y no es un signo de un problema grave. Si tiene parestesia por mucho tiempo, posiblemente necesite higinio evaluación médica.  Siga estas indicaciones en aldana casa:  Consumo de alcohol     Image   No patel alcohol si:  El médico le indica que no lo estevan. Está embarazada, jef que puede estar embarazada o está tratando de quedar embarazada.Si michelle alcohol, limite la cantidad que consume:  De 0 a 1 medida por día para las mujeres. De 0 a 2 medidas por día para los hombres.Esté atento a la cantidad de alcohol que hay en las bebidas que clint. En los [place] higinio medida equivale a higinio botella típica de cerveza (12 onzas), media copa de vino (5 onzas) o higinio medida de bebida tony (1½ onza).Nutrición     Siga higinio dieta saludable. [place] incluye lo siguiente:  Consumir alimentos con alto contenido de fibra, alessandra frutas y verduras frescas, cereales integrales y frijoles.Limitar el consumo de alimentos que contienen gran cantidad de grasas y azúcares procesados, alessandra los alimentos fritos o dulces.Instrucciones generales     [place] los medicamentos de venta kiara y los recetados solamente alessandra se lo haya indicado el médico.No consuma ningún producto que contenga nicotina o tabaco, alessandra cigarrillos y cigarrillos electrónicos. Si necesita ayuda para dejar de fumar, consulte al médico.Si tiene diabetes, colabore con el médico para asegurarse de mantener el nivel de azúcar en tanya dentro de un rango saludable.Si siente adormecimiento en los pies:  Controle si tiene enrojecimiento, calor e hinchazón todos los días.Use calcetines acolchados y zapatos cómodos. Estos ayudan a proteger los pies.Concurra a todas las visitas de seguimiento alessandra se lo haya indicado el médico. [place] es importante.Comuníquese con un médico si:  Elisa síntomas de parestesia empeoran o no desaparecen.La sensación de ardor o picazón empeora al caminar.Siente dolor o tiene calambres.Siente mareos.Tiene higinio erupción cutánea.Solicite ayuda inmediatamente si:  Se siente débil.Tiene dificultad para caminar o moverse.Tiene problemas para hablar, comprender o mary.Se siente confundido.No puede controlar la orina (micción) ni las evacuaciones intestinales (deposiciones).Pierde la sensibilidad (tiene adormecimiento) después de higinio lesión.Tiene mayor debilidad en un brazo o higinio pierna.Pierde el conocimiento (se desmaya).Resumen  La parestesia es higinio sensación de ardor o picazón. Suele manifestarse en las tai, los brazos, las piernas o los pies.En la mayoría de los casos, la sensación desaparece al poco tiempo y no es un signo de un problema grave.Si tiene parestesia por mucho tiempo, posiblemente necesite higinio evaluación médica.Esta información no tiene alessandra fin reemplazar el consejo del médico. Asegúrese de hacerle al médico cualquier pregunta que tenga.

## 2019-10-14 NOTE — ED PROVIDER NOTE - NSFOLLOWUPCLINICS_GEN_ALL_ED_FT
[facility]  [facility]  [address]. - [address] [place] [address]  Phone: [phone]  Fax: [phone]  Follow Up Time:     [facility]  Ne[facility]rology  [address] [place] [address]  Phone: [phone]  Fax: [phone]  Follow Up Time:

## 2019-10-14 NOTE — ED PROVIDER NOTE - PATIENT PORTAL LINK FT
You can access the FollowMyHealth Patient Portal offered by [facility] by registering at the following website: [email or web address]. By joining [employer]’s FollowMyHealth portal, you will also be able to view your health information using other applications (apps) compatible with our system.

## 2019-10-14 NOTE — ED ADULT TRIAGE NOTE - CHIEF COMPLAINT QUOTE
Pt stated she has pain on the right side of her head going down the right side of her body with numbness that started 3 days ago.

## 2019-10-14 NOTE — ED PROVIDER NOTE - CLINICAL SUMMARY MEDICAL DECISION MAKING FREE TEXT BOX
62 yo F with right sided head pain, body aches, and paresthesias. Labs, head CT, CXR unremarkable. Was give migraine cocktail with resolution of symptoms. Neurologically intact without focal deficits. GCS 15. AAOX3. Patient has already scheduled appointment with neurologist this week. Rx provided for naproxen. Vital signs stable. Nontoxic and medically stable for discharged. Return precautions provided and patient understands to return to the ED for worsening signs and symptoms. Patient's questions answered.    Results and follow up care discussed with  David [id number]

## 2019-10-28 ENCOUNTER — CLINICAL ADVICE (OUTPATIENT)
Age: 64
End: 2019-10-28

## 2019-11-01 ENCOUNTER — APPOINTMENT (OUTPATIENT)
Dept: UROLOGY | Facility: CLINIC | Age: 64
End: 2019-11-01
Payer: COMMERCIAL

## 2019-11-01 PROCEDURE — 99214 OFFICE O/P EST MOD 30 MIN: CPT

## 2019-11-01 NOTE — ASSESSMENT
[FreeTextEntry1] : Very pleasant 63-year-old woman presents for followup of recurrent urinary tract infections and acute UTI\par -Prior CT from March reviewed demonstrating no urologic abnormalities\par -Recent culture reviewed, we will await definitive culture and sensitivity\par -Start Cipro given symptoms and no relief of symptoms with Bactrim\par -Start Hiprex for UTI prevention\par -Azo cranberry\par -Follow up in 2 months

## 2019-11-01 NOTE — HISTORY OF PRESENT ILLNESS
[FreeTextEntry1] : Very pleasant 63-year-old woman presents for followup of recurrent urinary tract infection and acute urinary tract infection. Patient reports that approximately one week ago she began to experience dysuria. She went to her primary care physician who prescribed her antibiotics. This did not radiate her symptoms. 2 days ago she came to the office and gave a urine sample for urine culture. This currently demonstrates greater than 100,000 colony-forming units of gram-negative rods. Definitive identification of a bacteria and sensitivities are not available yet. She reports persistent dysuria. No hematuria. No flank pain. She does report suprapubic pain. No nausea or vomiting. No fevers or chills. [Urinary Retention] : no urinary retention [Urinary Urgency] : urinary urgency [Urinary Frequency] : no urinary frequency [Nocturia] : nocturia [Straining] : no straining [Dysuria] : dysuria [Hematuria - Gross] : no gross hematuria [Hematuria - Microscopic] : microscopic hematuria [Bladder Spasm] : no bladder spasm [Abdominal Pain] : no abdominal pain [Flank Pain] : no flank pain [Fever] : no fever [Fatigue] : no fatigue [Nausea] : no nausea [Anorexia] : no anorexia

## 2019-11-04 LAB — BACTERIA UR CULT: ABNORMAL

## 2019-12-20 ENCOUNTER — APPOINTMENT (OUTPATIENT)
Dept: PULMONOLOGY | Facility: CLINIC | Age: 64
End: 2019-12-20
Payer: COMMERCIAL

## 2019-12-20 VITALS
OXYGEN SATURATION: 92 % | DIASTOLIC BLOOD PRESSURE: 95 MMHG | HEART RATE: 96 BPM | WEIGHT: 226 LBS | RESPIRATION RATE: 16 BRPM | SYSTOLIC BLOOD PRESSURE: 162 MMHG | HEIGHT: 61 IN | TEMPERATURE: 97.9 F | BODY MASS INDEX: 42.67 KG/M2

## 2019-12-20 DIAGNOSIS — Z87.898 PERSONAL HISTORY OF OTHER SPECIFIED CONDITIONS: ICD-10-CM

## 2019-12-20 PROCEDURE — 99214 OFFICE O/P EST MOD 30 MIN: CPT

## 2019-12-22 PROBLEM — Z87.898 HISTORY OF SNORING: Status: ACTIVE | Noted: 2019-09-06

## 2019-12-22 NOTE — PHYSICAL EXAM
[Low Lying Soft Palate] : low lying soft palate [Enlarged Base of the Tongue] : enlargement of the base of the tongue [Heart Sounds] : normal S1 and S2 [Auscultation Breath Sounds / Voice Sounds] : lungs were clear to auscultation bilaterally [Bowel Sounds] : normal bowel sounds [Abdomen Soft] : soft [Abdomen Tenderness] : non-tender [] : no hepato-splenomegaly [Motor Exam] : the motor exam was normal [Affect] : the affect was normal [Mood] : the mood was normal [General Appearance - Well Developed] : well developed [General Appearance - Well Nourished] : well nourished [No Deformities] : no deformities [Erythema] : no erythema of the pharynx [Elongated Uvula] : no elongated uvula [Normal Oropharynx] : abnormal oropharynx [FreeTextEntry1] : short thick [FreeTextEntry2] : no edema

## 2019-12-22 NOTE — HISTORY OF PRESENT ILLNESS
[FreeTextEntry1] : 63 year old woman with HTN, HLD\par \par She presented due to abnormal CXR, suggesting interstitial lung disease.  She had cough after a URI. \par \par She has mild dyspnea with exertion, and fatigue.  She notes dyspnea every time she has URI.  She notes improvement when she uses bronchodilator, usually via nebulizer.\par \par She denies rash, arthralgia, difficulty swallowing.\par \par She also reported snoring  No daytime somnolence\par \par She also sometimes has lower extremity edema.\par \par \par \par She has had evaluation for GERD.  She had EGD which was negative. She had ENT evaluation who suspected GERD.\par \par She was told of nasal congestion\par \par She denies any heart problems. \par \par She has had GERD in past\par \par \par She underwent CT chest on July 19, 2019 that demonstrated no ILD and no suspicious lesions\par \par Still has mid cough.  \par \par She has been scheduled for overnight sleep study.\par \par reportsthat she snores\par \par PSH\par right shoulder surgery\par appendectomy\par uterine prolapse\par \par \par MEDS:\par losartan\par simvastatin\par gabapentin for pelvic pain\par \par \par SH\par \par non smoker\par \par  smokes heavily\par works as home attendant\par \par lives in apartment\par no mold\par \par no pets\par \par \par ALLERGY\par \par NKDA\par \par \par no allergy\par \par ROS\par \par no weight loss\par \par

## 2019-12-22 NOTE — DISCUSSION/SUMMARY
[FreeTextEntry1] : Cough, may be due to GERD and postnasal drip. She has small hiatus hernia on abdominal CT.  CT head did not demonstrated any abnormality of the sinuses.   She will follow up with ENT and use saline nasal wash and nasal moisturizer.\par \par Sleep disturbance:  She will undergo sleep study.\par He has had the influenza vaccine this season. \par \par [facility]as [facility] \par \par

## 2020-01-02 ENCOUNTER — RESULT REVIEW (OUTPATIENT)
Age: 65
End: 2020-01-02

## 2020-01-03 ENCOUNTER — APPOINTMENT (OUTPATIENT)
Dept: UROLOGY | Facility: CLINIC | Age: 65
End: 2020-01-03

## 2020-01-09 ENCOUNTER — OUTPATIENT (OUTPATIENT)
Dept: OUTPATIENT SERVICES | Facility: HOSPITAL | Age: 65
LOS: 1 days | End: 2020-01-09
Payer: COMMERCIAL

## 2020-01-09 ENCOUNTER — APPOINTMENT (OUTPATIENT)
Dept: SLEEP CENTER | Facility: CLINIC | Age: 65
End: 2020-01-09
Payer: COMMERCIAL

## 2020-01-09 DIAGNOSIS — Z90.49 ACQUIRED ABSENCE OF OTHER SPECIFIED PARTS OF DIGESTIVE TRACT: Chronic | ICD-10-CM

## 2020-01-09 PROCEDURE — 95810 POLYSOM 6/> YRS 4/> PARAM: CPT | Mod: 26

## 2020-01-09 PROCEDURE — 95810 POLYSOM 6/> YRS 4/> PARAM: CPT

## 2020-01-10 ENCOUNTER — APPOINTMENT (OUTPATIENT)
Dept: UROLOGY | Facility: CLINIC | Age: 65
End: 2020-01-10

## 2020-01-10 DIAGNOSIS — G47.33 OBSTRUCTIVE SLEEP APNEA (ADULT) (PEDIATRIC): ICD-10-CM

## 2020-01-17 ENCOUNTER — APPOINTMENT (OUTPATIENT)
Dept: PULMONOLOGY | Facility: CLINIC | Age: 65
End: 2020-01-17
Payer: COMMERCIAL

## 2020-01-17 VITALS
DIASTOLIC BLOOD PRESSURE: 89 MMHG | HEIGHT: 61 IN | TEMPERATURE: 97.4 F | WEIGHT: 228 LBS | RESPIRATION RATE: 16 BRPM | OXYGEN SATURATION: 96 % | SYSTOLIC BLOOD PRESSURE: 147 MMHG | HEART RATE: 74 BPM | BODY MASS INDEX: 43.05 KG/M2

## 2020-01-17 DIAGNOSIS — J98.4 OTHER DISORDERS OF LUNG: ICD-10-CM

## 2020-01-17 DIAGNOSIS — R05 COUGH: ICD-10-CM

## 2020-01-17 PROCEDURE — 99214 OFFICE O/P EST MOD 30 MIN: CPT

## 2020-01-19 PROBLEM — R05 COUGH: Status: ACTIVE | Noted: 2019-06-03

## 2020-01-19 PROBLEM — J98.4 RESTRICTIVE LUNG DISEASE: Status: ACTIVE | Noted: 2020-01-17

## 2020-01-19 NOTE — PHYSICAL EXAM
[General Appearance - Well Developed] : well developed [General Appearance - Well Nourished] : well nourished [No Deformities] : no deformities [Enlarged Base of the Tongue] : enlargement of the base of the tongue [Low Lying Soft Palate] : low lying soft palate [Heart Sounds] : normal S1 and S2 [Auscultation Breath Sounds / Voice Sounds] : lungs were clear to auscultation bilaterally [Bowel Sounds] : normal bowel sounds [Abdomen Tenderness] : non-tender [] : no hepato-splenomegaly [Abdomen Soft] : soft [Motor Exam] : the motor exam was normal [Affect] : the affect was normal [Mood] : the mood was normal [Normal Oropharynx] : abnormal oropharynx [Elongated Uvula] : no elongated uvula [Erythema] : no erythema of the pharynx [FreeTextEntry1] : unchanged [FreeTextEntry2] : no edema

## 2020-01-19 NOTE — DISCUSSION/SUMMARY
[FreeTextEntry1] : Cough:  CT chest negative, possible GERD\par \par CAROLINE diagnosed on sleep study:  CPAP titration ordered\par \par HTN:  advised re BP\par \par advised to lose weight\par \par [facility]

## 2020-01-19 NOTE — REVIEW OF SYSTEMS
[As Noted in HPI] : as noted in HPI [Dry Mouth] : dry mouth [Frequent URIs] : no frequent upper respiratory infections

## 2020-01-19 NOTE — HISTORY OF PRESENT ILLNESS
[FreeTextEntry1] : 63 year old woman with HTN, HLD\par \par \par The visit was conducted using  line. \par  \par She presented due to abnormal CXR, suggesting interstitial lung disease. \par \par She has mild dyspnea with exertion, and fatigue.  She notes dyspnea every time she has URI.  She notes improvement when she uses bronchodilator, usually via nebulizer.\par \par She underwent CT chest on July 19, 2019 that demonstrated no ILD and no suspicious lesions\par \par Still has mid cough.  \par She also reported snoring  with No daytime somnolence.  \par She has been undergone overnight sleep study. Moderate sleep apnea diagnosed.  She awaits CPAP titration study. \par \par She also sometimes has lower extremity edema.\par \par \par She denies rash, arthralgia, difficulty swallowing.\par \par She has had evaluation for GERD.  \par She has had GERD in past. She had EGD which was negative. She had ENT evaluation who suspected GERD.\par \par She was told of nasal congestion\par \par She denies any heart problems. 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

## 2020-01-27 NOTE — PATIENT PROFILE ADULT. - LANGUAGE ASSISTANCE NEEDED
Pre-visit chart review completed.  Immunizations reviewed and updated.    Patient due for the following    DEXA SCAN     Influenza Vaccine (1)       
Yes-Patient/Caregiver accepts free interpretation services...

## 2020-03-11 ENCOUNTER — FORM ENCOUNTER (OUTPATIENT)
Age: 65
End: 2020-03-11

## 2020-03-12 ENCOUNTER — OUTPATIENT (OUTPATIENT)
Dept: OUTPATIENT SERVICES | Facility: HOSPITAL | Age: 65
LOS: 1 days | End: 2020-03-12
Payer: COMMERCIAL

## 2020-03-12 ENCOUNTER — APPOINTMENT (OUTPATIENT)
Dept: SLEEP CENTER | Facility: CLINIC | Age: 65
End: 2020-03-12
Payer: COMMERCIAL

## 2020-03-12 DIAGNOSIS — Z90.49 ACQUIRED ABSENCE OF OTHER SPECIFIED PARTS OF DIGESTIVE TRACT: Chronic | ICD-10-CM

## 2020-03-12 PROCEDURE — 95811 POLYSOM 6/>YRS CPAP 4/> PARM: CPT

## 2020-03-12 PROCEDURE — 95811 POLYSOM 6/>YRS CPAP 4/> PARM: CPT | Mod: 26

## 2020-03-13 DIAGNOSIS — G47.33 OBSTRUCTIVE SLEEP APNEA (ADULT) (PEDIATRIC): ICD-10-CM

## 2020-07-14 NOTE — ED PROVIDER NOTE - MEDICAL DECISION MAKING DETAILS
Additional Notes: Patient consent was obtained to proceed with the visit and recommended plan of care after discussion of all risks and benefits, including the risks of COVID-19 exposure.
Detail Level: Simple
Additional Notes: Ed&c site resolved no evidence of reoccurrence
pt w/ suprapubic pain and dysuria x 2 weeks, will obtain UA, give Ibuprofen and reassess.

## 2020-08-06 ENCOUNTER — APPOINTMENT (OUTPATIENT)
Dept: SURGERY | Facility: CLINIC | Age: 65
End: 2020-08-06
Payer: COMMERCIAL

## 2020-08-06 VITALS
TEMPERATURE: 97.1 F | BODY MASS INDEX: 42.48 KG/M2 | WEIGHT: 225 LBS | OXYGEN SATURATION: 100 % | SYSTOLIC BLOOD PRESSURE: 133 MMHG | HEIGHT: 61 IN | HEART RATE: 78 BPM | DIASTOLIC BLOOD PRESSURE: 86 MMHG

## 2020-08-06 DIAGNOSIS — N63.20 UNSPECIFIED LUMP IN THE LEFT BREAST, UNSPECIFIED QUADRANT: ICD-10-CM

## 2020-08-06 PROCEDURE — 99243 OFF/OP CNSLTJ NEW/EST LOW 30: CPT

## 2020-08-06 RX ORDER — METHENAMINE HIPPURATE 1 G/1
1 TABLET ORAL TWICE DAILY
Qty: 60 | Refills: 5 | Status: DISCONTINUED | COMMUNITY
Start: 2019-11-01 | End: 2020-08-06

## 2020-08-06 RX ORDER — ESTRADIOL 0.1 MG/G
0.1 CREAM VAGINAL
Qty: 1 | Refills: 3 | Status: DISCONTINUED | COMMUNITY
Start: 2019-02-22 | End: 2020-08-06

## 2020-08-06 RX ORDER — GUAIFENESIN 600 MG/1
600 TABLET, EXTENDED RELEASE ORAL TWICE DAILY
Qty: 60 | Refills: 2 | Status: DISCONTINUED | COMMUNITY
Start: 2019-06-03 | End: 2020-08-06

## 2020-08-06 RX ORDER — CIPROFLOXACIN HYDROCHLORIDE 500 MG/1
500 TABLET, FILM COATED ORAL TWICE DAILY
Qty: 10 | Refills: 0 | Status: DISCONTINUED | COMMUNITY
Start: 2019-11-01 | End: 2020-08-06

## 2020-08-06 RX ORDER — SOLIFENACIN SUCCINATE 10 MG/1
10 TABLET ORAL
Qty: 30 | Refills: 5 | Status: DISCONTINUED | COMMUNITY
Start: 2019-06-28 | End: 2020-08-06

## 2020-08-06 RX ORDER — SODIUM CHLORIDE 0.65 %
0.65 AEROSOL, SPRAY (ML) NASAL TWICE DAILY
Qty: 1 | Refills: 2 | Status: DISCONTINUED | COMMUNITY
Start: 2019-06-03 | End: 2020-08-06

## 2020-08-06 RX ORDER — LOSARTAN POTASSIUM 100 MG/1
TABLET, FILM COATED ORAL
Refills: 0 | Status: ACTIVE | COMMUNITY

## 2020-08-06 RX ORDER — PREDNISONE 10 MG
TABLET ORAL
Refills: 0 | Status: ACTIVE | COMMUNITY

## 2020-08-06 NOTE — DATA REVIEWED
[FreeTextEntry1] : 	\par Exam requested by:\par ESTELLA ADORNO MD\par [address]\par [facility] [id number]\par SITE PERFORMED: ANA GARCÍA\par SITE PHONE: [phone]\par Patient: CITLALY LUTZ\par YOB: 1955\par Phone: [phone]\par MRN: [id number] Acc: [id number]\par Date of Exam: 07-\par  \par EXAM:  DIGITAL UNILATERAL LEFT DIAGNOSTIC MAMMOGRAM WITH CAD AND TOMOSYNTHESIS AND BREAST ULTRASOUND\par \par HISTORY:  The patient is 64 years old and is seen for follow-up calcifications left breast as well as several nodule and status post biopsy nodule 10:00 left breast\par \par CLINICAL BREAST EXAMINATION:  The patient reports that her last clinical breast exam was within the past year. \par \par COMPARISON:  The present examination has been compared to prior breast imaging studies dating back to October 25, 2018\par \par MAMMOGRAM:\par \par TECHNIQUE:  Full-field digital mammography of the left breast was obtained. Additional imaging is composed of CC and MLO views left breast were obtained as well as spot magnification views left breast in CC and straight lateral projection. Low-dose full-field digital breast tomosynthesis examination was performed with 3D acquisitions and C-View synthesized 2D reconstructed images. Computer-aided detection (CAD) was utilized. \par \par FINDINGS:\par BREAST COMPOSITION:  The breasts are heterogeneously dense, which may obscure small masses.\par \par Biopsy marker is noted 10:00 left breast. There is a round 1.0 cm nodule in the central slightly outer upper left breast 12 1:00 7.5 cm from the nipple. Some nodular asymmetry is again noted in the upper outer left breast. There are scattered calcifications in the upper outer left breast suggestive of fibrocystic changes.\par \par BREAST ULTRASOUND:  \par \par TECHNIQUE:  Targeted left breast ultrasound was performed. \par \par FINDINGS: \par \par \par In the left breast at 12:00 there is a 1.3 x 0.5 cm hypoechoic nodule that appears to correspond to the round 1.0 cm nodule on mammography. This has increased in size slightly. In addition the entire margins are not sharp and distinct at this time. Needle biopsy under ultrasound guidance recommended. At 10:00 1 cm from the nipple at site of biopsy there is a small 2 mm hypoechoic nodule. \par \par IMPRESSION: \par Round 1.0 cm nodule in the central slightly outer upper left breast at about 12 1:00 more prominent than on previous study. There appears to be a corresponding 1.3 x 0.5 cm hypoechoic nodule at 12:00 that is increased in size. Scattered calcifications upper outer left breast suggests fibrocystic changes. Status post biopsy 10:00 left breast  \par \par FOLLOW-UP:  Biopsy should be considered. \par Biopsy under ultrasound guidance of 1.3 cm hypoechoic nodule 12:00 left breast   \par \par ASSESSMENT:  BI-RADS Category 4:  Suspicious. \par \par This examination should not preclude the clinical evaluation of a suspicious palpable abnormality.\par \par As per the FDA requirements, a layman's letter has been generated and sent to your patient stating the results and recommendations of this breast imaging study. We have entered your patient into our reminder system and will notify them when they are due for their next breast imaging exam. \par \par Thank you for the opportunity to participate in the care of this patient.  \par  \par Pelon Swan MD  - Electronically Signed: 07- 3:19 PM \par Physician to Physician Direct Line is: [phone]

## 2020-08-06 NOTE — PHYSICAL EXAM
[Oriented to Person] : oriented to person [Alert] : alert [Oriented to Time] : oriented to time [Oriented to Place] : oriented to place [Calm] : calm [de-identified] :   anicteric.  Nasal mucosa pink, septum midline. Oral mucosa pink.  Tongue midline, Pharynx without exudates.\par   [de-identified] : She  is alert, well-groomed, and cheerful.\par   [de-identified] : No chest deformity. Breast are symmetric, Normal contours. No nodules, masses, tenderness, or axillary or supraclavicular  adenopathy. No nipple discharge. no skin retraction \par   [de-identified] :  Neck supple. Trachea midline. Thyroid isthmus barely palpable, lobes not felt.\par

## 2020-08-06 NOTE — CONSULT LETTER
[Dear  ___] : Dear  [unfilled], [Consult Letter:] : I had the pleasure of evaluating your patient, [unfilled]. [Please see my note below.] : Please see my note below. [Consult Closing:] : Thank you very much for allowing me to participate in the care of this patient.  If you have any questions, please do not hesitate to contact me. [Sincerely,] : Sincerely, [FreeTextEntry3] : Adriel Darling MD, FACS

## 2020-08-06 NOTE — PLAN
[FreeTextEntry1] : Ms. LUTZ  is presenting  today for an evaluation .  she is doing well and offers no complaints.  Results of  her recent  imaging and physical examination findings were discussed in details.   She  was advised to have Biopsy under ultrasound guidance of 1.3 cm hypoechoic nodule 12:00 left breast    and return after the tests. Importance of monthly self-breast examination was reinforced.  Patient's questions and concerns addressed to patient's satisfaction.\par \par Vitamin E daily for breast pain \par Avoid caffein and Chocolates to prevent breast pain

## 2020-08-06 NOTE — HISTORY OF PRESENT ILLNESS
[de-identified] : Patient is a 64 year  year-old female  who was referred by Dr. Volodymyr Galloway with the chief complaint of having a Left breast mass.  She  denies any trauma and She has no nipple discharge. She  denies any fever, night sweats or loss of appetite.    There is  family history of breast carcinoma in her maternal aunt in her 30s and maternal grandmother in her 60's .   Menarche at age 11, -6 para-3, and her last menstrual period was when she was 42. Patient is on no hormonal replacement therapy.   she had a left breast cyst drained after her last pregnancy. she reports having breast imaging every year. \par Patient had BL breast US and Mammogram on 2019 Deemed Birads 0. She had LEFT breast Call back Mammogram and US on  that was deemed BI-RADS Category 4 because of  3 mm hypoechoic nodule with slightly thickened wall 10 o'clock left breast 1 cm from the nipple. core biopsy was recommended.  In addition short-term follow-up diagnostic mammography left breast in 6 months for calcifications upper outer left breast as well as nodular asymmetry 11 o'clock left breast.. In addition short-term follow-up targeted left breast ultrasound 11 12 o'clock recommended     Ms. LUTZ  is s/p Left 10 o'clock sono-guided biopsy on 2020. Patient's pathology results were  consistent with  Fragments of benign epithelial lined cystic structure and fibroadipose tissue.  Ms. LUTZ  is s/p follow up  left breast Mammo and US on 2020 that was deemed BIRADS 4. It showed round 1.0 cm nodule in the central slightly outer upper left breast at about 12 1:00 more prominent than on previous study. There appears to be a corresponding 1.3 x 0.5 cm hypoechoic nodule at 12:00 that is increased in size. Scattered calcifications upper outer left breast suggests fibrocystic changes. Status post biopsy 10:00 left breast \par

## 2020-08-18 ENCOUNTER — APPOINTMENT (OUTPATIENT)
Dept: SURGICAL ONCOLOGY | Facility: CLINIC | Age: 65
End: 2020-08-18

## 2020-09-08 ENCOUNTER — APPOINTMENT (OUTPATIENT)
Dept: SURGICAL ONCOLOGY | Facility: CLINIC | Age: 65
End: 2020-09-08
Payer: COMMERCIAL

## 2020-09-08 ENCOUNTER — RESULT REVIEW (OUTPATIENT)
Age: 65
End: 2020-09-08

## 2020-09-08 PROCEDURE — 99205 OFFICE O/P NEW HI 60 MIN: CPT | Mod: 25

## 2020-09-08 PROCEDURE — 19083 BX BREAST 1ST LESION US IMAG: CPT | Mod: LT

## 2020-09-14 ENCOUNTER — EMERGENCY (EMERGENCY)
Facility: HOSPITAL | Age: 65
LOS: 1 days | End: 2020-09-14
Attending: EMERGENCY MEDICINE
Payer: COMMERCIAL

## 2020-09-14 VITALS
TEMPERATURE: 99 F | DIASTOLIC BLOOD PRESSURE: 90 MMHG | RESPIRATION RATE: 17 BRPM | OXYGEN SATURATION: 97 % | HEART RATE: 89 BPM | SYSTOLIC BLOOD PRESSURE: 162 MMHG

## 2020-09-14 VITALS
TEMPERATURE: 98 F | RESPIRATION RATE: 18 BRPM | SYSTOLIC BLOOD PRESSURE: 192 MMHG | HEART RATE: 93 BPM | DIASTOLIC BLOOD PRESSURE: 100 MMHG | OXYGEN SATURATION: 98 % | HEIGHT: 61 IN

## 2020-09-14 DIAGNOSIS — Z90.49 ACQUIRED ABSENCE OF OTHER SPECIFIED PARTS OF DIGESTIVE TRACT: Chronic | ICD-10-CM

## 2020-09-14 LAB
ACETONE SERPL-MCNC: NEGATIVE — SIGNIFICANT CHANGE UP
ALBUMIN SERPL ELPH-MCNC: 4 G/DL — SIGNIFICANT CHANGE UP (ref 3.5–5)
ALP SERPL-CCNC: 86 U/L — SIGNIFICANT CHANGE UP (ref 40–120)
ALT FLD-CCNC: 44 U/L DA — SIGNIFICANT CHANGE UP (ref 10–60)
ANION GAP SERPL CALC-SCNC: 6 MMOL/L — SIGNIFICANT CHANGE UP (ref 5–17)
APPEARANCE UR: CLEAR — SIGNIFICANT CHANGE UP
AST SERPL-CCNC: 25 U/L — SIGNIFICANT CHANGE UP (ref 10–40)
BACTERIA # UR AUTO: ABNORMAL /HPF
BASOPHILS # BLD AUTO: 0.03 K/UL — SIGNIFICANT CHANGE UP (ref 0–0.2)
BASOPHILS NFR BLD AUTO: 0.4 % — SIGNIFICANT CHANGE UP (ref 0–2)
BILIRUB SERPL-MCNC: 0.4 MG/DL — SIGNIFICANT CHANGE UP (ref 0.2–1.2)
BILIRUB UR-MCNC: NEGATIVE — SIGNIFICANT CHANGE UP
BUN SERPL-MCNC: 16 MG/DL — SIGNIFICANT CHANGE UP (ref 7–18)
CALCIUM SERPL-MCNC: 9.1 MG/DL — SIGNIFICANT CHANGE UP (ref 8.4–10.5)
CHLORIDE SERPL-SCNC: 106 MMOL/L — SIGNIFICANT CHANGE UP (ref 96–108)
CO2 SERPL-SCNC: 28 MMOL/L — SIGNIFICANT CHANGE UP (ref 22–31)
COLOR SPEC: YELLOW — SIGNIFICANT CHANGE UP
CREAT SERPL-MCNC: 0.74 MG/DL — SIGNIFICANT CHANGE UP (ref 0.5–1.3)
DIFF PNL FLD: ABNORMAL
EOSINOPHIL # BLD AUTO: 0.11 K/UL — SIGNIFICANT CHANGE UP (ref 0–0.5)
EOSINOPHIL NFR BLD AUTO: 1.4 % — SIGNIFICANT CHANGE UP (ref 0–6)
EPI CELLS # UR: SIGNIFICANT CHANGE UP /HPF
GLUCOSE SERPL-MCNC: 95 MG/DL — SIGNIFICANT CHANGE UP (ref 70–99)
GLUCOSE UR QL: NEGATIVE — SIGNIFICANT CHANGE UP
HCT VFR BLD CALC: 44.6 % — SIGNIFICANT CHANGE UP (ref 34.5–45)
HGB BLD-MCNC: 15.3 G/DL — SIGNIFICANT CHANGE UP (ref 11.5–15.5)
IMM GRANULOCYTES NFR BLD AUTO: 0.1 % — SIGNIFICANT CHANGE UP (ref 0–1.5)
KETONES UR-MCNC: NEGATIVE — SIGNIFICANT CHANGE UP
LEUKOCYTE ESTERASE UR-ACNC: ABNORMAL
LIDOCAIN IGE QN: 128 U/L — SIGNIFICANT CHANGE UP (ref 73–393)
LYMPHOCYTES # BLD AUTO: 1.6 K/UL — SIGNIFICANT CHANGE UP (ref 1–3.3)
LYMPHOCYTES # BLD AUTO: 20.2 % — SIGNIFICANT CHANGE UP (ref 13–44)
MAGNESIUM SERPL-MCNC: 2.5 MG/DL — SIGNIFICANT CHANGE UP (ref 1.6–2.6)
MCHC RBC-ENTMCNC: 30 PG — SIGNIFICANT CHANGE UP (ref 27–34)
MCHC RBC-ENTMCNC: 34.3 GM/DL — SIGNIFICANT CHANGE UP (ref 32–36)
MCV RBC AUTO: 87.5 FL — SIGNIFICANT CHANGE UP (ref 80–100)
MONOCYTES # BLD AUTO: 0.41 K/UL — SIGNIFICANT CHANGE UP (ref 0–0.9)
MONOCYTES NFR BLD AUTO: 5.2 % — SIGNIFICANT CHANGE UP (ref 2–14)
NEUTROPHILS # BLD AUTO: 5.75 K/UL — SIGNIFICANT CHANGE UP (ref 1.8–7.4)
NEUTROPHILS NFR BLD AUTO: 72.7 % — SIGNIFICANT CHANGE UP (ref 43–77)
NITRITE UR-MCNC: NEGATIVE — SIGNIFICANT CHANGE UP
NRBC # BLD: 0 /100 WBCS — SIGNIFICANT CHANGE UP (ref 0–0)
NT-PROBNP SERPL-SCNC: 78 PG/ML — SIGNIFICANT CHANGE UP (ref 0–125)
PH UR: 5 — SIGNIFICANT CHANGE UP (ref 5–8)
PLATELET # BLD AUTO: 225 K/UL — SIGNIFICANT CHANGE UP (ref 150–400)
POTASSIUM SERPL-MCNC: 3.9 MMOL/L — SIGNIFICANT CHANGE UP (ref 3.5–5.3)
POTASSIUM SERPL-SCNC: 3.9 MMOL/L — SIGNIFICANT CHANGE UP (ref 3.5–5.3)
PROT SERPL-MCNC: 8.1 G/DL — SIGNIFICANT CHANGE UP (ref 6–8.3)
PROT UR-MCNC: NEGATIVE — SIGNIFICANT CHANGE UP
RBC # BLD: 5.1 M/UL — SIGNIFICANT CHANGE UP (ref 3.8–5.2)
RBC # FLD: 12.6 % — SIGNIFICANT CHANGE UP (ref 10.3–14.5)
RBC CASTS # UR COMP ASSIST: SIGNIFICANT CHANGE UP /HPF (ref 0–2)
SODIUM SERPL-SCNC: 140 MMOL/L — SIGNIFICANT CHANGE UP (ref 135–145)
SP GR SPEC: 1.01 — SIGNIFICANT CHANGE UP (ref 1.01–1.02)
TROPONIN I SERPL-MCNC: <0.015 NG/ML — SIGNIFICANT CHANGE UP (ref 0–0.04)
UROBILINOGEN FLD QL: NEGATIVE — SIGNIFICANT CHANGE UP
WBC # BLD: 7.91 K/UL — SIGNIFICANT CHANGE UP (ref 3.8–10.5)
WBC # FLD AUTO: 7.91 K/UL — SIGNIFICANT CHANGE UP (ref 3.8–10.5)
WBC UR QL: SIGNIFICANT CHANGE UP /HPF (ref 0–5)

## 2020-09-14 PROCEDURE — 71045 X-RAY EXAM CHEST 1 VIEW: CPT

## 2020-09-14 PROCEDURE — 83690 ASSAY OF LIPASE: CPT

## 2020-09-14 PROCEDURE — 99285 EMERGENCY DEPT VISIT HI MDM: CPT

## 2020-09-14 PROCEDURE — 85025 COMPLETE CBC W/AUTO DIFF WBC: CPT

## 2020-09-14 PROCEDURE — 83735 ASSAY OF MAGNESIUM: CPT

## 2020-09-14 PROCEDURE — 81001 URINALYSIS AUTO W/SCOPE: CPT

## 2020-09-14 PROCEDURE — 93005 ELECTROCARDIOGRAM TRACING: CPT

## 2020-09-14 PROCEDURE — 83880 ASSAY OF NATRIURETIC PEPTIDE: CPT

## 2020-09-14 PROCEDURE — 71045 X-RAY EXAM CHEST 1 VIEW: CPT | Mod: 26

## 2020-09-14 PROCEDURE — 99284 EMERGENCY DEPT VISIT MOD MDM: CPT | Mod: 25

## 2020-09-14 PROCEDURE — 36415 COLL VENOUS BLD VENIPUNCTURE: CPT

## 2020-09-14 PROCEDURE — 82009 KETONE BODYS QUAL: CPT

## 2020-09-14 PROCEDURE — 84484 ASSAY OF TROPONIN QUANT: CPT

## 2020-09-14 PROCEDURE — 80053 COMPREHEN METABOLIC PANEL: CPT

## 2020-09-14 NOTE — ED PROVIDER NOTE - OBJECTIVE STATEMENT
64 y.o. female claims she had a routine check up on 5/31, noted pt with tachycardia, pt was sent to see a cardiologist today.  Pt woke with constant Lt sided CP-burning, nonradiating, worse when touches, no associated symptoms, cardiologist sent pt to ED, given 1NTGsl with no relief.  Also given 2 BASA by EMS.  Pt fractured Lt lower 3 weeks ago 8/24.  No coughing, COVID exposure, 64 y.o. female claims she had a routine check up on 5/31, noted pt with tachycardia, pt was sent to see a cardiologist today.  Pt woke with constant Lt sided focal CP-burning, nonradiating, worse when touches, no associated symptoms, cardiologist sent pt to ED, given 1NTGsl with no relief.  Also given 2 BASA by EMS.  Pt fractured Lt lower 3 weeks ago 8/24.  No coughing, COVID exposure,

## 2020-09-14 NOTE — ED ADULT TRIAGE NOTE - CHIEF COMPLAINT QUOTE
biba sent from the doctor's office for chest pain . NTG 1 SL given at the doctor's office .  mg by ems

## 2020-09-14 NOTE — ED ADULT NURSE NOTE - OBJECTIVE STATEMENT
Pt c/o of left pectoral chest pain started 10AM this morning. Pt went to cardiology's office and sent to ED by cardiology. As per pt she has a fall 2 weeks ago with left rib facture. Pain is burning in nature. Denies sob. currently complaints pain only with palpation to the left pectoral region

## 2020-09-14 NOTE — ED PROVIDER NOTE - PROGRESS NOTE DETAILS
Pt's feeling better, Trop-neg, pt with atypical CP, will d/c home with son, advised to f/u with cardio

## 2020-09-14 NOTE — ED ADULT NURSE NOTE - NSIMPLEMENTINTERV_GEN_ALL_ED
Implemented All Universal Safety Interventions:  [place] to call system. Call bell, personal items and telephone within reach. Instruct patient to call for assistance. Room bathroom lighting operational. Non-slip footwear when patient is off stretcher. Physically safe environment: no spills, clutter or unnecessary equipment. Stretcher in lowest position, wheels locked, appropriate side rails in place.

## 2021-01-21 ENCOUNTER — TRANSCRIPTION ENCOUNTER (OUTPATIENT)
Age: 66
End: 2021-01-21

## 2021-02-18 ENCOUNTER — TRANSCRIPTION ENCOUNTER (OUTPATIENT)
Age: 66
End: 2021-02-18

## 2021-03-22 ENCOUNTER — EMERGENCY (EMERGENCY)
Facility: HOSPITAL | Age: 66
LOS: 1 days | Discharge: HOME | End: 2021-03-22
Attending: EMERGENCY MEDICINE
Payer: COMMERCIAL

## 2021-03-22 VITALS
RESPIRATION RATE: 16 BRPM | SYSTOLIC BLOOD PRESSURE: 136 MMHG | TEMPERATURE: 98 F | DIASTOLIC BLOOD PRESSURE: 86 MMHG | HEART RATE: 109 BPM | HEIGHT: 61 IN | OXYGEN SATURATION: 96 % | WEIGHT: 222.67 LBS

## 2021-03-22 VITALS
HEART RATE: 104 BPM | TEMPERATURE: 99 F | DIASTOLIC BLOOD PRESSURE: 85 MMHG | RESPIRATION RATE: 16 BRPM | OXYGEN SATURATION: 95 % | SYSTOLIC BLOOD PRESSURE: 132 MMHG

## 2021-03-22 DIAGNOSIS — Z90.49 ACQUIRED ABSENCE OF OTHER SPECIFIED PARTS OF DIGESTIVE TRACT: Chronic | ICD-10-CM

## 2021-03-22 PROBLEM — F41.9 ANXIETY DISORDER, UNSPECIFIED: Chronic | Status: ACTIVE | Noted: 2020-09-14

## 2021-03-22 LAB
APPEARANCE UR: CLEAR — SIGNIFICANT CHANGE UP
BACTERIA # UR AUTO: ABNORMAL /HPF
BILIRUB UR-MCNC: ABNORMAL
COLOR SPEC: ABNORMAL
DIFF PNL FLD: ABNORMAL
EPI CELLS # UR: ABNORMAL /HPF
GLUCOSE UR QL: NEGATIVE — SIGNIFICANT CHANGE UP
GRAN CASTS # UR COMP ASSIST: ABNORMAL /LPF
HYALINE CASTS # UR AUTO: ABNORMAL /LPF
KETONES UR-MCNC: NEGATIVE — SIGNIFICANT CHANGE UP
LEUKOCYTE ESTERASE UR-ACNC: ABNORMAL
NITRITE UR-MCNC: POSITIVE
PH UR: 6.5 — SIGNIFICANT CHANGE UP (ref 5–8)
PROT UR-MCNC: 100
RBC CASTS # UR COMP ASSIST: ABNORMAL /HPF (ref 0–2)
SP GR SPEC: 1.01 — SIGNIFICANT CHANGE UP (ref 1.01–1.02)
UROBILINOGEN FLD QL: 4
WBC UR QL: ABNORMAL /HPF (ref 0–5)

## 2021-03-22 PROCEDURE — 87086 URINE CULTURE/COLONY COUNT: CPT

## 2021-03-22 PROCEDURE — 99283 EMERGENCY DEPT VISIT LOW MDM: CPT

## 2021-03-22 PROCEDURE — 81001 URINALYSIS AUTO W/SCOPE: CPT

## 2021-03-22 PROCEDURE — 87186 SC STD MICRODIL/AGAR DIL: CPT

## 2021-03-22 RX ORDER — CEPHALEXIN 500 MG
1 CAPSULE ORAL
Qty: 10 | Refills: 0
Start: 2021-03-22 | End: 2021-03-26

## 2021-03-22 RX ORDER — CEPHALEXIN 500 MG
500 CAPSULE ORAL ONCE
Refills: 0 | Status: COMPLETED | OUTPATIENT
Start: 2021-03-22 | End: 2021-03-22

## 2021-03-22 RX ORDER — IBUPROFEN 200 MG
1 TABLET ORAL
Qty: 56 | Refills: 0
Start: 2021-03-22 | End: 2021-04-04

## 2021-03-22 RX ORDER — IBUPROFEN 200 MG
600 TABLET ORAL ONCE
Refills: 0 | Status: COMPLETED | OUTPATIENT
Start: 2021-03-22 | End: 2021-03-22

## 2021-03-22 RX ADMIN — Medication 500 MILLIGRAM(S): at 20:29

## 2021-03-22 RX ADMIN — Medication 600 MILLIGRAM(S): at 20:29

## 2021-03-22 NOTE — ED PROVIDER NOTE - PATIENT PORTAL LINK FT
You can access the FollowMyHealth Patient Portal offered by [facility] by registering at the following website: [email or web address]. By joining [facility]’s FollowMyHealth portal, you will also be able to view your health information using other applications (apps) compatible with our system.

## 2021-03-22 NOTE — ED PROVIDER NOTE - NSFOLLOWUPINSTRUCTIONS_ED_ALL_ED_FT
Infección del tracto urinario en mujeres    LO QUE NECESITA SABER:    ¿Qué es higinio infección del tracto urinario (ITU)?La ITU ocurre cuando entran bacterias en el tracto urinario. Mendoza tracto urinario incluye travon riñones, uréteres, vejiga y uretra. La orina es producida en los riñones y fluye del uréter a la vejiga. La orina sale de la vejiga a través de la uretra. Higinio infección del tracto urinario es más común in la parte inferior de mendoza tracto urinario que incluye mendoza vejiga y uretra.     Aparato urinario femenino         ¿Qué aumenta mi riesgo de contraer higinio ITU?  •Higinio sonda urinaria o autosondaje      •Embarazo      •Problemas del tracto urinario, alessandra un estrechamiento, cálculos renales o incapacidad de vaciar la vejiga por completo      •Historial de ITU      •Relaciones sexuales      •La menopausia      •La diabetes u obesidad      ¿Cuáles son los signos y síntomas de higinio ITU?  •Orinar con más frecuencia que de costumbre, perder orina o despertarse para orinar      •Dolor o ardor al orinar      •Dolor o presión en la parte inferior del abdomen      •Orina con mal olor      •Tanya en la orina      ¿Cómo se diagnostica higinio infección en el tracto urinario?Mendoza médico le preguntará acerca de travon signos y síntomas. Es posible que le presione el abdomen, los lados y la espalda para revisar si usted siente dolor. La orina se analizará para detectar bacterias que pueden estar causando la infección. Si tiene infecciones urinarias con frecuencia, puede necesitar más pruebas para encontrar la causa.    ¿Cómo se trata higinio infección en el tracto urinario?  •Los antibióticosayudan a combatir higinio infección bacteriana. Si tiene infecciones urinarias con frecuencia (llamadas recurrentes), se le pueden nayan antibióticos para que los tome regularmente. Le enseñarán cuándo y cómo usar los antibióticos. El objetivo es prevenir las infecciones urinarias, marylu no causar resistencia a los antibióticos mediante el uso de antibióticos con demasiada frecuencia.      •Los medicamentospara disminuir el dolor y el ardor al orinar. También ayudarán a disminuir la sensación de necesitar orinar con frecuencia. Estos medicamentos harán que orine de color anaranjado o hylton.      ¿Qué puedo hacer para prevenir higinio ITU?  •Consulte con mendoza médico sobre los métodos anticonceptivos.Es posible que tenga que cambiar mendoza método si está aumentando mendoza riesgo de infecciones urinarias.      •Vacíe la vejiga con frecuencia.Orine y vacíe la vejiga tan pronto alessandra usted sienta la necesidad. No retenga la orina por largos períodos de tiempo.      •Límpiese de adelante hacia atrás después de orinar o de tener higinio evacuación intestinal.[place] ayudará a evitar que los gérmenes entren en el tracto urinario a través de la uretra.      •Los [employer] [employer] líquidos alessandra se le haya indicado.Pregunte cuánto líquido debe lorena cada día y cuáles líquidos son los más adecuados para usted. Es probable que usted necesite lorena más líquidos de lo habitual para ayudar a deshacerse de la bacteria. No tome alcohol, cafeína ni jugos cítricos. Estos pueden irritar mendoza vejiga y aumentar travon síntomas. Mendoza médico puede recomendarle el jugo de arándano para prevenir higinio infección urinaria.      •Orine después de tener relaciones sexuales.[place] puede ayudar a eliminar las bacterias que pasan celestina el sexo.      •No tome duchas vaginales ni use desodorantes femeninos.Estos pueden cambiar el equilibrio químico de la vagina.      •Cambie las compresas o los tampones a menudo.[place] ayudará a evitar que los gérmenes entren en el tracto urinario.      •Consulte con mendoza médico sobre los métodos anticonceptivos.Es posible que tenga que cambiar mendoza método si está aumentando mendoza riesgo de infecciones urinarias.      •Use ropa interior de algodón y ropa suelta.Los pantalones ajustados y la ropa interior de nylon pueden atrapar humedad y hacer que las bacterias crezcan.      •Se puede recomendar el uso de estrógeno vaginal.[place] medicamento ayuda a prevenir las infecciones urinarias en mujeres que antoine pasado por la menopausia o que están en la perimenopausia.      •Realice ejercicios para los músculos pélvicos con frecuencia.Los ejercicios para los músculos pélvicos ayudan a empezar y parar de orinar. Los músculos pélvicos waldemar ayudan a vaciar la vejiga más fácilmente. Apriete estos músculos firmemente celestina 5 segundos alessandra si estuviera tratando de retener el flujo de orina. Luego relaje por 5 segundos. Aumente gradualmente a 10 segundos. [place] 3 series de 15 repeticiones al día o alessandra se le indique.      ¿Cuándo meghana buscar atención inmediata?  •Usted está orinando muy poco o nada en absoluto.      •Usted tiene fiebre selina con temblor y escalofríos.      •Usted tiene dolor en el costado o en la espalda que empeora.      ¿Cuándo meghana llamar a mi médico?  •Usted tiene fiebre leve.      •Usted no siente mejoría después de 2 días de lorena los antibióticos.      •Usted tiene síntomas nuevos, tales alessandra tanya o pus en la orina.      •Usted está vomitando.      •Usted tiene preguntas o inquietudes acerca de mendoza condición o cuidado.      ACUERDOS SOBRE MENDOZA CUIDADO:    Usted tiene el derecho de ayudar a planear mendoza cuidado. Aprenda todo lo que pueda sobre mendoza condición y alessandra darle tratamiento. Discuta travon opciones de tratamiento con travon médicos para decidir el cuidado que usted desea recibir. Usted siempre tiene el derecho de rechazar el tratamiento.

## 2021-03-22 NOTE — ED PROVIDER NOTE - OBJECTIVE STATEMENT
65 y.o female with a PMHX of HLD, HTN, and a PSHx presents to the ED c.o suprapubic pain x4 days associated with dysuria and increased frequency x20 times. Patient states she was taking a form of Peridium OTC. Patient denies any fever, vomiting, nausea, back pain, vaginal discharge or any other acute complaints. NKDA

## 2021-03-23 ENCOUNTER — APPOINTMENT (OUTPATIENT)
Dept: UROLOGY | Facility: CLINIC | Age: 66
End: 2021-03-23
Payer: COMMERCIAL

## 2021-03-23 VITALS
TEMPERATURE: 96.8 F | BODY MASS INDEX: 41.54 KG/M2 | HEART RATE: 93 BPM | WEIGHT: 220 LBS | DIASTOLIC BLOOD PRESSURE: 84 MMHG | OXYGEN SATURATION: 96 % | SYSTOLIC BLOOD PRESSURE: 126 MMHG | HEIGHT: 61 IN

## 2021-03-23 PROCEDURE — 99214 OFFICE O/P EST MOD 30 MIN: CPT

## 2021-03-23 PROCEDURE — 99072 ADDL SUPL MATRL&STAF TM PHE: CPT

## 2021-03-23 RX ORDER — CIPROFLOXACIN HYDROCHLORIDE 500 MG/1
500 TABLET, FILM COATED ORAL TWICE DAILY
Qty: 10 | Refills: 0 | Status: ACTIVE | COMMUNITY
Start: 2021-03-23 | End: 1900-01-01

## 2021-03-23 NOTE — HISTORY OF PRESENT ILLNESS
[FreeTextEntry1] : Very pleasant 65-year-old woman presents for followup of recurrent urinary tract infection and acute urinary tract infection. Patient reports that yesterday she began to experience dysuria. She went to the emergency department where she was prescribed cephalexin 500 mg twice daily.  She took 1 pill of this medication this morning.  This did not improve her symptoms.  Urinalysis in the emergency department demonstrated moderate leukocyte esterase and positive nitrites.  She reports persistent dysuria.  She denies gross hematuria.  No flank pain or suprapubic pain.  No nausea or vomiting.

## 2021-03-23 NOTE — ASSESSMENT
[FreeTextEntry1] : Very pleasant 65-year-old woman who presents for follow-up of recurrent urinary tract infections with new complaint of acute urinary tract infection at this time\par -Urinalysis reviewed demonstrating concern for an acute urinary tract infection\par -Urine culture from 2019, the last time patient was seen, demonstrates an E. coli urinary tract infection sensitive to Cipro\par -Start Cipro based on prior cultures\par -We discussed risks of Cipro\par -Follow-up in 1 week with renal ultrasound\par -Patient will call or come to the office on Thursday or Friday if her symptoms do not improve by this time

## 2021-03-29 ENCOUNTER — APPOINTMENT (OUTPATIENT)
Dept: UROLOGY | Facility: CLINIC | Age: 66
End: 2021-03-29
Payer: COMMERCIAL

## 2021-03-29 ENCOUNTER — NON-APPOINTMENT (OUTPATIENT)
Age: 66
End: 2021-03-29

## 2021-03-29 VITALS
SYSTOLIC BLOOD PRESSURE: 133 MMHG | HEART RATE: 86 BPM | RESPIRATION RATE: 12 BRPM | HEIGHT: 61 IN | TEMPERATURE: 96.9 F | DIASTOLIC BLOOD PRESSURE: 86 MMHG | OXYGEN SATURATION: 98 %

## 2021-03-29 DIAGNOSIS — G47.33 OBSTRUCTIVE SLEEP APNEA (ADULT) (PEDIATRIC): ICD-10-CM

## 2021-03-29 PROCEDURE — 99214 OFFICE O/P EST MOD 30 MIN: CPT

## 2021-03-29 PROCEDURE — 99072 ADDL SUPL MATRL&STAF TM PHE: CPT

## 2021-03-29 NOTE — HISTORY OF PRESENT ILLNESS
[FreeTextEntry1] : Very pleasant 65-year-old woman who presents for follow-up of recurrent urinary tract infections, suprapubic pain.  She recently completed a course of Cipro for urinary tract infection.  Urine culture demonstrates sensitivity to Cipro.  She reports that yesterday she began to experience significant suprapubic pain.  She denies associated incontinence.  She feels like she is emptying her bladder completely.  PVR today 122 cc, 1 hour after she urinated.

## 2021-03-29 NOTE — ASSESSMENT
[FreeTextEntry1] : Very pleasant 65-year-old woman who presents for follow-up of recurrent urinary tract infections, worsening suprapubic abdominal pain\par -PVR today 122\par -Previous urine culture reviewed–E. coli sensitive to ciprofloxacin\par -Repeat urine culture today\par -Renal ultrasound\par -Cystoscopy

## 2021-03-30 ENCOUNTER — APPOINTMENT (OUTPATIENT)
Dept: UROLOGY | Facility: CLINIC | Age: 66
End: 2021-03-30
Payer: COMMERCIAL

## 2021-03-30 DIAGNOSIS — R10.2 PELVIC AND PERINEAL PAIN: ICD-10-CM

## 2021-03-30 DIAGNOSIS — J84.9 INTERSTITIAL PULMONARY DISEASE, UNSPECIFIED: ICD-10-CM

## 2021-03-30 PROCEDURE — 99213 OFFICE O/P EST LOW 20 MIN: CPT | Mod: 25

## 2021-03-30 PROCEDURE — 76775 US EXAM ABDO BACK WALL LIM: CPT

## 2021-03-30 PROCEDURE — 99072 ADDL SUPL MATRL&STAF TM PHE: CPT

## 2021-03-30 NOTE — ASSESSMENT
[FreeTextEntry1] : Very pleasant 65-year-old woman who presents for follow-up of suprapubic abdominal pain, dysuria\par -We again discussed potential etiologies of her symptoms\par -Renal ultrasound images reviewed with the patient demonstrating no hydronephrosis, renal masses, kidney stones\par -Follow-up for cystoscopy

## 2021-03-30 NOTE — HISTORY OF PRESENT ILLNESS
[FreeTextEntry1] : Very pleasant 65-year-old woman who presents for follow-up of recurrent urinary tract infections, suprapubic pain.  She reports that the pain has significantly improved over the last 24 hours.  She previously underwent a bladder scan which demonstrated a PVR of 122 cc.  She underwent a renal ultrasound today which demonstrates no abnormalities.  No other complaints.

## 2021-04-01 LAB — BACTERIA UR CULT: ABNORMAL

## 2021-04-01 RX ORDER — SULFAMETHOXAZOLE AND TRIMETHOPRIM 800; 160 MG/1; MG/1
800-160 TABLET ORAL
Qty: 14 | Refills: 0 | Status: ACTIVE | COMMUNITY
Start: 2021-04-01 | End: 1900-01-01

## 2021-04-02 ENCOUNTER — APPOINTMENT (OUTPATIENT)
Dept: UROLOGY | Facility: CLINIC | Age: 66
End: 2021-04-02
Payer: COMMERCIAL

## 2021-04-02 ENCOUNTER — EMERGENCY (EMERGENCY)
Facility: HOSPITAL | Age: 66
LOS: 1 days | Discharge: HOME | End: 2021-04-02
Attending: EMERGENCY MEDICINE
Payer: COMMERCIAL

## 2021-04-02 VITALS
SYSTOLIC BLOOD PRESSURE: 158 MMHG | RESPIRATION RATE: 18 BRPM | DIASTOLIC BLOOD PRESSURE: 85 MMHG | TEMPERATURE: 98 F | HEART RATE: 84 BPM | WEIGHT: 220.02 LBS | OXYGEN SATURATION: 98 % | HEIGHT: 61 IN

## 2021-04-02 DIAGNOSIS — R31.29 OTHER MICROSCOPIC HEMATURIA: ICD-10-CM

## 2021-04-02 DIAGNOSIS — R35.1 NOCTURIA: ICD-10-CM

## 2021-04-02 DIAGNOSIS — R39.15 URGENCY OF URINATION: ICD-10-CM

## 2021-04-02 DIAGNOSIS — Z90.49 ACQUIRED ABSENCE OF OTHER SPECIFIED PARTS OF DIGESTIVE TRACT: Chronic | ICD-10-CM

## 2021-04-02 LAB
ALBUMIN SERPL ELPH-MCNC: 4.2 G/DL — SIGNIFICANT CHANGE UP (ref 3.5–5)
ALP SERPL-CCNC: 83 U/L — SIGNIFICANT CHANGE UP (ref 40–120)
ALT FLD-CCNC: 38 U/L DA — SIGNIFICANT CHANGE UP (ref 10–60)
ANION GAP SERPL CALC-SCNC: 7 MMOL/L — SIGNIFICANT CHANGE UP (ref 5–17)
APPEARANCE UR: CLEAR — SIGNIFICANT CHANGE UP
AST SERPL-CCNC: 18 U/L — SIGNIFICANT CHANGE UP (ref 10–40)
BACTERIA # UR AUTO: ABNORMAL /HPF
BILIRUB SERPL-MCNC: 0.4 MG/DL — SIGNIFICANT CHANGE UP (ref 0.2–1.2)
BILIRUB UR-MCNC: NEGATIVE — SIGNIFICANT CHANGE UP
BUN SERPL-MCNC: 16 MG/DL — SIGNIFICANT CHANGE UP (ref 7–18)
CALCIUM SERPL-MCNC: 9.1 MG/DL — SIGNIFICANT CHANGE UP (ref 8.4–10.5)
CHLORIDE SERPL-SCNC: 106 MMOL/L — SIGNIFICANT CHANGE UP (ref 96–108)
CO2 SERPL-SCNC: 27 MMOL/L — SIGNIFICANT CHANGE UP (ref 22–31)
COLOR SPEC: YELLOW — SIGNIFICANT CHANGE UP
CREAT SERPL-MCNC: 0.92 MG/DL — SIGNIFICANT CHANGE UP (ref 0.5–1.3)
DIFF PNL FLD: ABNORMAL
EPI CELLS # UR: SIGNIFICANT CHANGE UP /HPF
GLUCOSE SERPL-MCNC: 101 MG/DL — HIGH (ref 70–99)
GLUCOSE UR QL: NEGATIVE — SIGNIFICANT CHANGE UP
HCT VFR BLD CALC: 44.8 % — SIGNIFICANT CHANGE UP (ref 34.5–45)
HGB BLD-MCNC: 14.9 G/DL — SIGNIFICANT CHANGE UP (ref 11.5–15.5)
KETONES UR-MCNC: NEGATIVE — SIGNIFICANT CHANGE UP
LEUKOCYTE ESTERASE UR-ACNC: NEGATIVE — SIGNIFICANT CHANGE UP
LIDOCAIN IGE QN: 143 U/L — SIGNIFICANT CHANGE UP (ref 73–393)
MCHC RBC-ENTMCNC: 28.9 PG — SIGNIFICANT CHANGE UP (ref 27–34)
MCHC RBC-ENTMCNC: 33.3 GM/DL — SIGNIFICANT CHANGE UP (ref 32–36)
MCV RBC AUTO: 87 FL — SIGNIFICANT CHANGE UP (ref 80–100)
NITRITE UR-MCNC: NEGATIVE — SIGNIFICANT CHANGE UP
NRBC # BLD: 0 /100 WBCS — SIGNIFICANT CHANGE UP (ref 0–0)
PH UR: 5 — SIGNIFICANT CHANGE UP (ref 5–8)
PLATELET # BLD AUTO: 220 K/UL — SIGNIFICANT CHANGE UP (ref 150–400)
POTASSIUM SERPL-MCNC: 4.2 MMOL/L — SIGNIFICANT CHANGE UP (ref 3.5–5.3)
POTASSIUM SERPL-SCNC: 4.2 MMOL/L — SIGNIFICANT CHANGE UP (ref 3.5–5.3)
PROT SERPL-MCNC: 7.9 G/DL — SIGNIFICANT CHANGE UP (ref 6–8.3)
PROT UR-MCNC: NEGATIVE — SIGNIFICANT CHANGE UP
RBC # BLD: 5.15 M/UL — SIGNIFICANT CHANGE UP (ref 3.8–5.2)
RBC # FLD: 12.2 % — SIGNIFICANT CHANGE UP (ref 10.3–14.5)
RBC CASTS # UR COMP ASSIST: ABNORMAL /HPF (ref 0–2)
SODIUM SERPL-SCNC: 140 MMOL/L — SIGNIFICANT CHANGE UP (ref 135–145)
SP GR SPEC: 1.01 — SIGNIFICANT CHANGE UP (ref 1.01–1.02)
UROBILINOGEN FLD QL: NEGATIVE — SIGNIFICANT CHANGE UP
WBC # BLD: 7.9 K/UL — SIGNIFICANT CHANGE UP (ref 3.8–10.5)
WBC # FLD AUTO: 7.9 K/UL — SIGNIFICANT CHANGE UP (ref 3.8–10.5)
WBC UR QL: SIGNIFICANT CHANGE UP /HPF (ref 0–5)

## 2021-04-02 PROCEDURE — 52000 CYSTOURETHROSCOPY: CPT

## 2021-04-02 PROCEDURE — 74177 CT ABD & PELVIS W/CONTRAST: CPT | Mod: 26,MA

## 2021-04-02 PROCEDURE — 83690 ASSAY OF LIPASE: CPT

## 2021-04-02 PROCEDURE — 96374 THER/PROPH/DIAG INJ IV PUSH: CPT | Mod: XU

## 2021-04-02 PROCEDURE — 99072 ADDL SUPL MATRL&STAF TM PHE: CPT

## 2021-04-02 PROCEDURE — 74177 CT ABD & PELVIS W/CONTRAST: CPT

## 2021-04-02 PROCEDURE — 81001 URINALYSIS AUTO W/SCOPE: CPT

## 2021-04-02 PROCEDURE — 99285 EMERGENCY DEPT VISIT HI MDM: CPT

## 2021-04-02 PROCEDURE — 85027 COMPLETE CBC AUTOMATED: CPT

## 2021-04-02 PROCEDURE — 36415 COLL VENOUS BLD VENIPUNCTURE: CPT

## 2021-04-02 PROCEDURE — 87086 URINE CULTURE/COLONY COUNT: CPT

## 2021-04-02 PROCEDURE — 80053 COMPREHEN METABOLIC PANEL: CPT

## 2021-04-02 PROCEDURE — 99284 EMERGENCY DEPT VISIT MOD MDM: CPT | Mod: 25

## 2021-04-02 RX ORDER — CEPHALEXIN 500 MG/1
500 CAPSULE ORAL
Qty: 10 | Refills: 0 | Status: ACTIVE | COMMUNITY
Start: 2021-03-22

## 2021-04-02 RX ORDER — GABAPENTIN 300 MG/1
300 CAPSULE ORAL
Qty: 30 | Refills: 0 | Status: ACTIVE | COMMUNITY
Start: 2021-03-25

## 2021-04-02 RX ORDER — BUPROPION HYDROCHLORIDE 300 MG/1
300 TABLET, EXTENDED RELEASE ORAL
Qty: 90 | Refills: 0 | Status: ACTIVE | COMMUNITY
Start: 2021-02-11

## 2021-04-02 RX ORDER — LORAZEPAM 1 MG/1
1 TABLET ORAL
Qty: 90 | Refills: 0 | Status: ACTIVE | COMMUNITY
Start: 2021-02-11

## 2021-04-02 RX ORDER — METHYLPREDNISOLONE 4 MG/1
4 TABLET ORAL
Qty: 21 | Refills: 0 | Status: ACTIVE | COMMUNITY
Start: 2021-01-29

## 2021-04-02 RX ORDER — ALENDRONATE SODIUM 70 MG/1
70 TABLET ORAL
Qty: 12 | Refills: 0 | Status: ACTIVE | COMMUNITY
Start: 2020-07-06

## 2021-04-02 RX ORDER — PANTOPRAZOLE 40 MG/1
40 TABLET, DELAYED RELEASE ORAL
Qty: 30 | Refills: 0 | Status: ACTIVE | COMMUNITY
Start: 2021-02-09

## 2021-04-02 RX ORDER — KETOROLAC TROMETHAMINE 30 MG/ML
15 SYRINGE (ML) INJECTION ONCE
Refills: 0 | Status: DISCONTINUED | OUTPATIENT
Start: 2021-04-02 | End: 2021-04-02

## 2021-04-02 RX ORDER — SUCRALFATE 1 G/10ML
1 SUSPENSION ORAL
Qty: 1200 | Refills: 0 | Status: ACTIVE | COMMUNITY
Start: 2021-02-11

## 2021-04-02 RX ORDER — CLOTRIMAZOLE AND BETAMETHASONE DIPROPIONATE 10; .5 MG/G; MG/G
1-0.05 CREAM TOPICAL
Qty: 15 | Refills: 0 | Status: ACTIVE | COMMUNITY
Start: 2021-03-17

## 2021-04-02 RX ORDER — AZITHROMYCIN 500 MG/1
500 TABLET, FILM COATED ORAL
Qty: 5 | Refills: 0 | Status: ACTIVE | COMMUNITY
Start: 2021-01-29

## 2021-04-02 RX ORDER — IBUPROFEN 600 MG/1
600 TABLET, FILM COATED ORAL
Qty: 56 | Refills: 0 | Status: ACTIVE | COMMUNITY
Start: 2021-03-22

## 2021-04-02 RX ORDER — MELOXICAM 7.5 MG/1
7.5 TABLET ORAL
Qty: 30 | Refills: 0 | Status: ACTIVE | COMMUNITY
Start: 2020-10-02

## 2021-04-02 RX ORDER — MELOXICAM 15 MG/1
15 TABLET ORAL
Qty: 30 | Refills: 0 | Status: ACTIVE | COMMUNITY
Start: 2021-03-17

## 2021-04-02 RX ORDER — ERGOCALCIFEROL 1.25 MG/1
1.25 MG CAPSULE, LIQUID FILLED ORAL
Qty: 4 | Refills: 0 | Status: ACTIVE | COMMUNITY
Start: 2020-10-12

## 2021-04-02 RX ORDER — OLOPATADINE HCL 1 MG/ML
0.1 SOLUTION/ DROPS OPHTHALMIC
Qty: 5 | Refills: 0 | Status: ACTIVE | COMMUNITY
Start: 2020-03-24

## 2021-04-02 RX ADMIN — Medication 15 MILLIGRAM(S): at 17:35

## 2021-04-02 RX ADMIN — Medication 15 MILLIGRAM(S): at 17:14

## 2021-04-02 NOTE — ED PROVIDER NOTE - NSFOLLOWUPINSTRUCTIONS_ED_ALL_ED_FT
Dysuria      Dysuria is pain or discomfort while urinating. The pain or discomfort may be felt in the part of your body that drains urine from the bladder (urethra) or in the surrounding tissue of the genitals. The pain may also be felt in the groin area, lower abdomen, or lower back. You may have to urinate frequently or have the sudden feeling that you have to urinate (urgency). Dysuria can affect both men and women, but it is more common in women.  Dysuria can be caused by many different things, including:  •Urinary tract infection.      •Kidney stones or bladder stones.      •Certain sexually transmitted infections (STIs), such as chlamydia.      •Dehydration.      •Inflammation of the tissues of the vagina.      •Use of certain medicines.      •Use of certain soaps or scented products that cause irritation.        Follow these instructions at home:    General instructions     •Watch your condition for any changes.      •Urinate often. Avoid holding urine for long periods of time.      •After a bowel movement or urination, women should cleanse from front to back, using each tissue only once.      •Urinate after sexual intercourse.      •Keep all follow-up visits as told by your health care provider. This is important.      •If you had any tests done to find the cause of dysuria, it is up to you to get your test results. Ask your health care provider, or the department that is doing the test, when your results will be ready.        Eating and drinking      •Drink enough fluid to keep your urine pale yellow.      •Avoid caffeine, tea, and alcohol. They can irritate the bladder and make dysuria worse. In men, alcohol may irritate the prostate.      Medicines     •Take over-the-counter and prescription medicines only as told by your health care provider.      •If you were prescribed an antibiotic medicine, take it as told by your health care provider. Do not stop taking the antibiotic even if you start to feel better.        Contact a health care provider if:    •You have a fever.      •You develop pain in your back or sides.      •You have nausea or vomiting.      •You have blood in your urine.      •You are not urinating as often as you usually do.        Get help right away if:    •Your pain is severe and not relieved with medicines.      •You cannot eat or drink without vomiting.      •You are confused.      •You have a rapid heartbeat while at rest.      •You have shaking or chills.      •You feel extremely weak.        Summary    •Dysuria is pain or discomfort while urinating. Many different conditions can lead to dysuria.      •If you have dysuria, you may have to urinate frequently or have the sudden feeling that you have to urinate (urgency).      •Watch your condition for any changes. Keep all follow-up visits as told by your health care provider.      •Make sure that you urinate often and drink enough fluid to keep your urine pale yellow.      This information is not intended to replace advice given to you by your health care provider. Make sure you discuss any questions you have with your health care provider.      Document Revised: 11/30/2018 Document Reviewed: 10/04/2018    Elsevier Patient Education © 2021 [employer]

## 2021-04-02 NOTE — ED ADULT NURSE NOTE - NSHOSCREENINGQ1_ED_ALL_ED
Pt called with vaginal itching. Denies odor or discharge. She does have elevated glucose. She believes it is a yeast infection and has tried 2 days of monistat with mild relief. Due to COVID19 restrictions, I will send 2 diflucan but pt will need to present in clinic if symptoms persist. She is overdue for well woman exam. She has been scheduled in June for this assuming COVID19 restrictions are lifted by then. Pt agrees with this plan of care.   
No

## 2021-04-02 NOTE — ED PROVIDER NOTE - PATIENT PORTAL LINK FT
You can access the FollowMyHealth Patient Portal offered by [facility] by registering at the following website: [email or web address]. By joining [facility]’s FollowMyHealth portal, you will also be able to view your health information using other applications (apps) compatible with our system.

## 2021-04-02 NOTE — ED ADULT NURSE NOTE - NSIMPLEMENTINTERV_GEN_ALL_ED
Implemented All Universal Safety Interventions:  [place] to call system. Call bell, personal items and telephone within reach. Instruct patient to call for assistance. Room bathroom lighting operational. Non-slip footwear when patient is off stretcher. Physically safe environment: no spills, clutter or unnecessary equipment. Stretcher in lowest position, wheels locked, appropriate side rails in place.

## 2021-04-02 NOTE — ED ADULT NURSE NOTE - OBJECTIVE STATEMENT
pt from home c/o of lower abdominal pain and dysuria x few weeks, pt reports taking antibiotic without relief, scheduled for outpatient CT scan but unable to wait

## 2021-04-02 NOTE — ED PROVIDER NOTE - CLINICAL SUMMARY MEDICAL DECISION MAKING FREE TEXT BOX
66 yo F with dysuria, likely recurrent UTI, had recent normal cystoscopy, just started on bactrim today. Plan - labs, CT (as pt is scheduled for one out pt next week), meds, reassess.

## 2021-04-02 NOTE — ED PROVIDER NOTE - OBJECTIVE STATEMENT
64 yo F pmh of HTN, HLD, recurrent UTIs, and psh of cholecystectomy c/o dysuria x a few weeks with lower abdo discomfort. Was in ED 10 days ago and had urine culture +E coli pan-sensitive; took keflex. Saw Dr Foley who did cystoscopy which was wnl per pt; scheduled for CT next week but pt states she cannot wait. Denies other acute complaints.  [id number]

## 2021-04-02 NOTE — ED PROVIDER NOTE - PROGRESS NOTE DETAILS
UA - no UTI (currently on abx, will finish course)  labs - wnl  CT - cystitis  Will dc with further urology fu. Discussed indications for patient return to ED. Patient understood.

## 2021-04-02 NOTE — ED ADULT TRIAGE NOTE - DOMESTIC TRAVEL HIGH RISK QUESTION
[FreeTextEntry1] : Ms. MAYS is a 53 year old woman with hyperparathyroidism, carpal tunnel syndrome, breast cancer s/p lumpectomy, mult. lung nodules, cervical dysplasia s/p total hysterectomy in 2012. FHx: alcoholic cirrhosis in father and uncle.\par \par - JACQUES\par   - US 10/2020: fatty liver\par   - fibroscan 2018: stage 0-1 fibrosis, stage 3 steatosis. Pt. has never had a liver biopsy.\par   - intermittent AST/ALT elevation. Serologic workup negative for other cause.\par - obesity, BMI 34\par - mixed hyperlipidemia\par - glucose intolerance. Holding alogliptin since semaglutide started.\par \par - painful epigastric bloating after eating: likely IBS. Has lactose intolerance. Does eat white bread and legumes now and then, but not daily. Resolved after starting semaglutide and eating less. \par - GERD. None since started eating less. Did not need her prn famotidine\par - pruritus: not likely related to liver disease\par \par - Immune to HAV, Immune to HBV\par - [facility]: [facility] \par Plan:\par - obesity: increase semaglutide to 7 mg/d PO. She will take two 3 mg tablets until she finishes the pills she has, then take the 7 mg/d. She will call if she does not tolerate 6 mg/d.\par - GERD, rib pain, JACQUES: weight loss - diet, exercise, semaglutide\par - fibroscan: will order again\par - return in 6 weeks after repeat labs\par  No

## 2021-04-02 NOTE — ED ADULT TRIAGE NOTE - CHIEF COMPLAINT QUOTE
c/o difficult urination w/ supra pubic pain , states saw a urologist yesterday   no blood in the urine

## 2021-04-02 NOTE — ED ADULT TRIAGE NOTE - ARRIVAL FROM
CC:  Anette Hill is here today for cough x 6 days.    Medications: medications verified, no change  Refills needed today?  NO  denies Latex allergy or sensitivity  Patient would like communication of their results via:    Home Phone: [phone] (home)  Okay to leave a message containing results? Yes   Reviewed overdue health maintenance topics with patient.             Home

## 2021-04-04 LAB
CULTURE RESULTS: SIGNIFICANT CHANGE UP
SPECIMEN SOURCE: SIGNIFICANT CHANGE UP

## 2021-06-15 NOTE — ED ADULT TRIAGE NOTE - MEANS OF ARRIVAL
Health Maintenance Due   Topic Date Due   • Shingles Vaccine (1 of 2) Never done   • Colorectal Cancer Screen-  Never done   • Breast Cancer Screening  07/25/2020   • Medicare Wellness Visit  08/04/2021       Patient is due for topics as listed above but is not proceeding with Immunization(s) Shingles, Colorectal Cancer Screening: Colonoscopy and Mammogram at this time.                ambulatory

## 2021-08-23 ENCOUNTER — EMERGENCY (EMERGENCY)
Facility: HOSPITAL | Age: 66
LOS: 1 days | Discharge: HOME | End: 2021-08-23
Attending: EMERGENCY MEDICINE
Payer: COMMERCIAL

## 2021-08-23 VITALS
RESPIRATION RATE: 24 BRPM | WEIGHT: 220.02 LBS | OXYGEN SATURATION: 98 % | SYSTOLIC BLOOD PRESSURE: 137 MMHG | HEIGHT: 61 IN | DIASTOLIC BLOOD PRESSURE: 75 MMHG | TEMPERATURE: 98 F | HEART RATE: 98 BPM

## 2021-08-23 DIAGNOSIS — Z90.49 ACQUIRED ABSENCE OF OTHER SPECIFIED PARTS OF DIGESTIVE TRACT: Chronic | ICD-10-CM

## 2021-08-23 LAB
ALBUMIN SERPL ELPH-MCNC: 3.5 G/DL — SIGNIFICANT CHANGE UP (ref 3.5–5)
ALP SERPL-CCNC: 77 U/L — SIGNIFICANT CHANGE UP (ref 40–120)
ALT FLD-CCNC: 34 U/L DA — SIGNIFICANT CHANGE UP (ref 10–60)
ANION GAP SERPL CALC-SCNC: 9 MMOL/L — SIGNIFICANT CHANGE UP (ref 5–17)
AST SERPL-CCNC: 10 U/L — SIGNIFICANT CHANGE UP (ref 10–40)
BASOPHILS # BLD AUTO: 0.04 K/UL — SIGNIFICANT CHANGE UP (ref 0–0.2)
BASOPHILS NFR BLD AUTO: 0.3 % — SIGNIFICANT CHANGE UP (ref 0–2)
BILIRUB SERPL-MCNC: 0.3 MG/DL — SIGNIFICANT CHANGE UP (ref 0.2–1.2)
BUN SERPL-MCNC: 16 MG/DL — SIGNIFICANT CHANGE UP (ref 7–18)
CALCIUM SERPL-MCNC: 8.8 MG/DL — SIGNIFICANT CHANGE UP (ref 8.4–10.5)
CHLORIDE SERPL-SCNC: 108 MMOL/L — SIGNIFICANT CHANGE UP (ref 96–108)
CK SERPL-CCNC: 77 U/L — SIGNIFICANT CHANGE UP (ref 21–215)
CO2 SERPL-SCNC: 27 MMOL/L — SIGNIFICANT CHANGE UP (ref 22–31)
CREAT SERPL-MCNC: 0.62 MG/DL — SIGNIFICANT CHANGE UP (ref 0.5–1.3)
EOSINOPHIL # BLD AUTO: 0.04 K/UL — SIGNIFICANT CHANGE UP (ref 0–0.5)
EOSINOPHIL NFR BLD AUTO: 0.3 % — SIGNIFICANT CHANGE UP (ref 0–6)
GLUCOSE SERPL-MCNC: 132 MG/DL — HIGH (ref 70–99)
HCT VFR BLD CALC: 43.5 % — SIGNIFICANT CHANGE UP (ref 34.5–45)
HGB BLD-MCNC: 14.4 G/DL — SIGNIFICANT CHANGE UP (ref 11.5–15.5)
IMM GRANULOCYTES NFR BLD AUTO: 1.1 % — SIGNIFICANT CHANGE UP (ref 0–1.5)
LYMPHOCYTES # BLD AUTO: 1.98 K/UL — SIGNIFICANT CHANGE UP (ref 1–3.3)
LYMPHOCYTES # BLD AUTO: 16.6 % — SIGNIFICANT CHANGE UP (ref 13–44)
MCHC RBC-ENTMCNC: 29.4 PG — SIGNIFICANT CHANGE UP (ref 27–34)
MCHC RBC-ENTMCNC: 33.1 GM/DL — SIGNIFICANT CHANGE UP (ref 32–36)
MCV RBC AUTO: 88.8 FL — SIGNIFICANT CHANGE UP (ref 80–100)
MONOCYTES # BLD AUTO: 0.9 K/UL — SIGNIFICANT CHANGE UP (ref 0–0.9)
MONOCYTES NFR BLD AUTO: 7.5 % — SIGNIFICANT CHANGE UP (ref 2–14)
NEUTROPHILS # BLD AUTO: 8.86 K/UL — HIGH (ref 1.8–7.4)
NEUTROPHILS NFR BLD AUTO: 74.2 % — SIGNIFICANT CHANGE UP (ref 43–77)
NRBC # BLD: 0 /100 WBCS — SIGNIFICANT CHANGE UP (ref 0–0)
NT-PROBNP SERPL-SCNC: 151 PG/ML — HIGH (ref 0–125)
PLATELET # BLD AUTO: 267 K/UL — SIGNIFICANT CHANGE UP (ref 150–400)
POTASSIUM SERPL-MCNC: 3.7 MMOL/L — SIGNIFICANT CHANGE UP (ref 3.5–5.3)
POTASSIUM SERPL-SCNC: 3.7 MMOL/L — SIGNIFICANT CHANGE UP (ref 3.5–5.3)
PROT SERPL-MCNC: 7.6 G/DL — SIGNIFICANT CHANGE UP (ref 6–8.3)
RBC # BLD: 4.9 M/UL — SIGNIFICANT CHANGE UP (ref 3.8–5.2)
RBC # FLD: 12.7 % — SIGNIFICANT CHANGE UP (ref 10.3–14.5)
SARS-COV-2 RNA SPEC QL NAA+PROBE: SIGNIFICANT CHANGE UP
SODIUM SERPL-SCNC: 144 MMOL/L — SIGNIFICANT CHANGE UP (ref 135–145)
TROPONIN I SERPL-MCNC: <0.015 NG/ML — SIGNIFICANT CHANGE UP (ref 0–0.04)
WBC # BLD: 11.95 K/UL — HIGH (ref 3.8–10.5)
WBC # FLD AUTO: 11.95 K/UL — HIGH (ref 3.8–10.5)

## 2021-08-23 PROCEDURE — 80053 COMPREHEN METABOLIC PANEL: CPT

## 2021-08-23 PROCEDURE — 94640 AIRWAY INHALATION TREATMENT: CPT

## 2021-08-23 PROCEDURE — 83880 ASSAY OF NATRIURETIC PEPTIDE: CPT

## 2021-08-23 PROCEDURE — 99284 EMERGENCY DEPT VISIT MOD MDM: CPT | Mod: 25

## 2021-08-23 PROCEDURE — 93005 ELECTROCARDIOGRAM TRACING: CPT

## 2021-08-23 PROCEDURE — 85025 COMPLETE CBC W/AUTO DIFF WBC: CPT

## 2021-08-23 PROCEDURE — 71046 X-RAY EXAM CHEST 2 VIEWS: CPT | Mod: 26

## 2021-08-23 PROCEDURE — 87635 SARS-COV-2 COVID-19 AMP PRB: CPT

## 2021-08-23 PROCEDURE — 36415 COLL VENOUS BLD VENIPUNCTURE: CPT

## 2021-08-23 PROCEDURE — 82550 ASSAY OF CK (CPK): CPT

## 2021-08-23 PROCEDURE — 71046 X-RAY EXAM CHEST 2 VIEWS: CPT

## 2021-08-23 PROCEDURE — 84484 ASSAY OF TROPONIN QUANT: CPT

## 2021-08-23 PROCEDURE — 99285 EMERGENCY DEPT VISIT HI MDM: CPT

## 2021-08-23 RX ORDER — ALBUTEROL 90 UG/1
2 AEROSOL, METERED ORAL
Qty: 1 | Refills: 0
Start: 2021-08-23

## 2021-08-23 RX ORDER — ALBUTEROL 90 UG/1
2.5 AEROSOL, METERED ORAL ONCE
Refills: 0 | Status: COMPLETED | OUTPATIENT
Start: 2021-08-23 | End: 2021-08-23

## 2021-08-23 RX ORDER — ALBUTEROL 90 UG/1
3 AEROSOL, METERED ORAL
Qty: 100 | Refills: 0
Start: 2021-08-23

## 2021-08-23 RX ADMIN — Medication 40 MILLIGRAM(S): at 21:26

## 2021-08-23 RX ADMIN — ALBUTEROL 2.5 MILLIGRAM(S): 90 AEROSOL, METERED ORAL at 21:25

## 2021-08-23 NOTE — ED PROVIDER NOTE - PATIENT PORTAL LINK FT
You can access the FollowMyHealth Patient Portal offered by [facility] by registering at the following website: [email or web address]. By joining [employer]’s FollowMyHealth portal, you will also be able to view your health information using other applications (apps) compatible with our system.

## 2021-08-23 NOTE — ED PROVIDER NOTE - CLINICAL SUMMARY MEDICAL DECISION MAKING FREE TEXT BOX
Patient with wheezing and a cough. Will perform chest x-ray, give albuterol and prednisone, and reassess patient . Patient with wheezing and a cough. Will perform chest x-ray, give albuterol and prednisone, and reassess patient .  patient feeling better with nebulizer and prednisone. home with rx and primary care physician followup. cough resolved

## 2021-08-23 NOTE — ED PROVIDER NOTE - NSFOLLOWUPINSTRUCTIONS_ED_ALL_ED_FT
Ataque de asma    Asthma Attack       Un ataque de asma, también llamado broncoespasmo lemuel, es la constricción y el estrechamiento repentinos de las vías respiratorias, lo que limita la cantidad de oxígeno que puede llegar a los pulmones. La causa del estrechamiento es la inflamación y la constricción de los músculos de las vías respiratorias (bronquios) que se encuentran en los pulmones.    También hay higinio producción de mucha mucosidad y esto produce un estrechamiento aún mayor de las vías respiratorias. Gorman causa problemas para respirar, respiración karey (sibilancia) y tos. El objetivo del tratamiento es abrir las vías respiratorias de los pulmones y reducir la inflamación.      ¿Cuáles son las causas?  Las posibles causas o desencadenantes de esta afección incluyen los siguientes:  •La caspa de los animales, los ácaros del polvo y las cucarachas.      •El moho, el polen de los árboles o el pasto, y el aire frío.      •Las sustancias contaminantes del aire, alessandra el polvo, los limpiadores del hogar, los aerosoles, las sustancias químicas waldemar, los olores intensos y el humo de cualquier clase.      •El estrés y las emociones waldemar, alessandra llorar o reír intensamente.      •El ejercicio o la actividad física que exige mucha energía.      •Las sustancias que se encuentran en alimentos y bebidas, alessandra en las frutas secas y el vino, llamadas sulfitos.    •Ciertos medicamentos o afecciones médicas, tales alessandra:  •Aspirina o betabloqueantes.      •Enfermedades infecciosas o inflamatorias, alessandra la gripe (influenza), el resfrío, la neumonía o la inflamación de las membranas nasales (rinitis).      •Enfermedad de reflujo gastroesofágico (ERGE). La ERGE es higinio afección en la que el ácido estomacal vuelve al esófago y se derrama en la tráquea, lo que puede irritar las vías respiratorias.          ¿Cuáles son los signos o síntomas?  Los síntomas de esta afección incluyen:  •Sibilancias. Gorman suena alessandra un silbido mientras respira. Gorman puede ocurrir solamente por la noche.      •Tos excesiva. Gorman puede ocurrir solamente por la noche.      •Opresión o dolor en el pecho.      •Falta de aire.      •Sensación de que no puede obtener el aire suficiente aunque estevan un esfuerzo por respirar (falta de aire).        ¿Cómo se diagnostica?  Esta afección se puede diagnosticar en función de lo siguiente:  •Elisa antecedentes médicos.      •Elisa síntomas.      •Un examen físico.    •Estudios para buscar otras causas de los síntomas u otras afecciones que puedan desencadenar el ataque de asma. Estas pruebas pueden incluir lo siguiente:  •Higinio radiografía de tórax.      •Análisis de tanya.      •Estudios para evaluar la función pulmonar, alessandra respirar dentro de un aparato que mide la cantidad de aire que puede inhalar y exhalar (espirometría).          ¿Cómo se trata?  El tratamiento de esta afección depende de la intensidad y la causa del ataque de asma.•En el wilmer de los ataques leves, puede recibir medicamentos mediante un inhalador manual (inhalador con medidor de dosis, IMD) o mediante un aparato que transforma el medicamento líquido en un vapor (nebulizador). Algunos de estos medicamentos son los siguientes:  •Medicamentos de rescate o de alivio rápido que relajan velozmente las vías respiratorias y los pulmones.      •Medicamentos de acción prolongada que se usan a diario para prevenir (controlar) los síntomas del asma.        •En el wilmer de los ataques moderados o graves, es posible que lo traten con medicamentos con corticoesteroides por boca o mediante higinio inyección por vía intravenosa en el hospital.      •En el wilmer de los ataques graves, puede necesitar oxigenoterapia o higinio máquina para respirar (respirador).      •Si la causa del ataque de asma es higinio infección por higinio bacteria, le darán antibióticos.        Siga estas instrucciones en aldana casa:    Medicamentos   •Use los medicamentos de venta kiara y los recetados solamente alessandra se lo haya indicado el médico.   •Mantenga los medicamentos actualizados.       •Asegúrese de tener todos elisa medicamentos disponibles en todo momento.        •Si le recetaron un antibiótico, tómelo alessandra se lo haya indicado el médico. No deje de lorena el antibiótico aunque comience a sentirse mejor.      •Informe al médico si puede estar embarazada, para que shaka se cerciore de que el medicamento para el asma pueda usarse con seguridad celestina el embarazo.        Evite los factores desencadenantes      •Lleve un registro de las cosas que desencadenan los ataques de asma. Evite la exposición a estos desencadenantes.      • No consuma ningún producto que contenga nicotina o tabaco, alessandra cigarrillos, cigarrillos electrónicos y tabaco de mascar. Si necesita ayuda para dejar de fumar, consulte al médico.      •Cuando esté alto el nivel de polen, contaminación del aire o humedad, mantenga las ventanas cerradas y use el aire acondicionado o vaya a lugares con aire acondicionado.      Plan de acción para controlar el asma   •Trabaje con el médico para elaborar un plan escrito para el control y el tratamiento de los ataques de asma (plan de acción para el asma). El plan debe incluir lo siguiente:  •Higinio lista de los factores desencadenantes del asma y el modo de evitarlos.      •Higinio lista de los síntomas que puede tener celestina un ataque de asma.      •Información sobre qué medicamento lorena, cuándo tomarlo y qué cantidad.      •Información para ayudar a comprender las mediciones de flujo richard.      •Medidas diarias que puede lorena para controlar los síntomas del asma.      •Información de contacto para elisa médicos.        •Si tiene un ataque de asma, actúe con rapidez. Siga los pasos de emergencia de aldana plan escrito de acción para el asma. Gorman puede hacer que no necesite ir al hospital.      Instrucciones generales     •Evite el ejercicio o la actividad física excesiva hasta que el ataque de asma se termine. Pregúntele al médico qué actividades son seguras para usted y cuándo puede retomar elisa actividades normales.      •Manténgase al día con todas las vacunas, alessandra las vacunas antigripal y contra la neumonía.      •Kathryn suficiente líquido alessandra para mantener la orina de color amarillo pálido. La buena hidratación ayuda a mantener la mucosidad de los pulmones liviana y de esta manera se puede expulsar con facilidad al toser.      • No consuma alcohol hasta que se haya recuperado.      •Concurra a todas las visitas de seguimiento alessandra se lo haya indicado el médico. Gorman es importante. El asma requiere atención médica exhaustiva.        Comuníquese con un médico si:    •Ha seguido aldana plan de acción celestina 1 hora y aldana lectura del flujo richard se mantiene entre el 50 % y el 79 %. Gorman se encuentra en la elizabeth amarilla que significa “precaución”.      •Necesita usar aldana medicamento de alivio rápido con mayor frecuencia que lo habitual.      •Los medicamentos le causan efectos secundarios, alessandra erupción cutánea, picazón, hinchazón o dificultad para respirar.      •Los síntomas no mejoran después de 48 horas.      •Al toser, elimina higinio mucosidad que es más espesa que lo habitual.      •Tiene fiebre.        Solicite ayuda de inmediato si:    •Aldana flujo richard es paz que el 50 % del mejor valor personal. Gorman se encuentra en la elizabeth darnell, lo que significa “peligro”.      •Tiene dificultad para respirar.      •Siente dolor o molestias en el pecho.      •Los medicamentos ya no tienen el efecto deseado.      •Al toser, elimina mucosidad con tanya.      •Tiene fiebre, y los síntomas empeoran repentinamente.      •Tiene dificultad para tragar.      •Se siente muy cansado y respirar le provoca cansancio.      Estos síntomas pueden representar un problema grave que constituye higinio emergencia. No espere a mary si los síntomas desaparecen. Solicite atención médica de inmediato. Comuníquese con el servicio de emergencias de aldana localidad (911 en los [place]). No conduzca por elisa propios medios hasta el hospital.       Resumen    •La causa de los ataques de asma es el estrechamiento o la falta de espacio en las vías respiratorias, lo que causa falta de aire, tos y respiración karey (sibilancia).      •Los factores desencadenantes de un ataque de asma pueden ser varios, tales alessandra alérgenos, cambios climáticos, actividad física, olores intensos y humo de cualquier clase.      •Si tiene un ataque de asma, actúe con rapidez. Siga los pasos de emergencia de aldana plan escrito de acción para el asma.      •Busque ayuda de inmediato si tiene higinio intensa dificultad para respirar, dolor en el pecho o fiebre, o si los medicamentos que tiene en aldana hogar ya no son eficaces para aliviar los síntomas.      Esta información no tiene alessandra fin reemplazar el consejo del médico. Asegúrese de hacerle al médico cualquier

## 2021-08-23 NOTE — ED PROVIDER NOTE - MUSCULOSKELETAL, MLM
Spine appears normal, range of motion is not limited, no muscle or joint tenderness. No calf swelling or edema.

## 2021-08-23 NOTE — ED PROVIDER NOTE - NSICDXPASTSURGICALHX_GEN_ALL_CORE_FT
PAST SURGICAL HISTORY:  S/P cholecystectomy     S/P shoulder surgery Right       PAST SURGICAL HISTORY:  S/P cholecystectomy     S/P shoulder surgery Right

## 2021-08-23 NOTE — ED PROVIDER NOTE - CARDIAC, MLM
Normal rate, regular rhythm.  Heart sounds S1, S2.  No murmurs, rubs or gallops. Tenderness to palpation at the chest wall.

## 2021-08-23 NOTE — ED ADULT TRIAGE NOTE - BSA (M2)
[facility]    [address] [place] [address]    Phone:  [phone]    Fax:  [phone]       Thank You for choosing us for your health care visit. We are glad to serve you and happy to provide you with this summary of your visit. Please help us to ensure we have accurate records. If you find anything that needs to be changed, please let our staff know as soon as possible.          Your Demographic Information     Patient Name Sex     Mica Winn Female 1979       Ethnic Group Patient Race    Not of  or  Origin White      Your Visit Details     Date & Time Provider Department    2017 10:00 AM [facility]      Your To Do List     Future Orders Please Complete On or Around Expires    CBC & AUTO DIFFERENTIAL  2017 May 07, 2017      Conditions Discussed Today or Order-Related Diagnoses        Comments    Pre-procedural laboratory examination    -  Primary       Your Vitals Were     BP Height Weight LMP BMI Smoking Status    140/96 5' 3\" (1.6 m) 171 lb (77.6 kg) 2017 30.29 kg/m2 Never Smoker      Medications Prescribed or Re-Ordered Today     None      Your Current Medications Are        Disp Refills Start End    Loratadine (CLARITIN) 10 MG Cap        Sig - Route: Take by mouth daily. - Oral    Class: Historical Med      Allergies     Cat Dander HIVES    Lactose RASH    Sulfa Antibiotics RASH, VOMITING      Immunizations History as of 2017     Name Date    Tdap 2016  2:40 PM      Problem List as of 2017     Hypothyroidism    Vitamin D deficiency            Patient Instructions     None       1.97

## 2021-08-23 NOTE — ED PROVIDER NOTE - OBJECTIVE STATEMENT
65 year old female with PMHx of asthma, HLD, HTN, and anxiety and no pertinent PSHx presents to the ED with complaints of one week of intermittent wheezing, a cough, and chest tightness. Patient states that she was evaluated at an Urgent Care for these symptoms and was told that she has allergies. Patient was reportedly given Zyrtec and Zipak at the time, which patient states that she has been taking without relief. Patient endorses that she has been feeling as though there is something stuck in her throat which she has been trying to cough up unsuccessfully. Patient notes that she has tried using her albuterol inhaler at home without relief to her symptoms. Patient denies all other acute complaints at this time. NKDA. 65 year old female with PMHx of asthma, HLD, HTN, and anxiety and no pertinent PSHx presents to the ED with complaints of one week of intermittent wheezing, a cough, and chest tightness. Patient states that she was evaluated at an Urgent Care for these symptoms and was told that she has allergies. Patient was reportedly given Zyrtec and Zpak at the time, which patient states that she has been taking without relief. Patient endorses that she has been feeling as though there is something stuck in her throat which she has been trying to cough up unsuccessfully. Patient notes that she has tried using her albuterol inhaler at home without relief to her symptoms. Patient denies all other acute complaints at this time. NKDA.

## 2021-08-24 VITALS
TEMPERATURE: 98 F | OXYGEN SATURATION: 98 % | DIASTOLIC BLOOD PRESSURE: 80 MMHG | RESPIRATION RATE: 18 BRPM | SYSTOLIC BLOOD PRESSURE: 165 MMHG | HEART RATE: 88 BPM

## 2021-10-26 NOTE — ED ADULT NURSE NOTE - CARDIO WDL
Detail Level: Detailed
Quality 110: Preventive Care And Screening: Influenza Immunization: Influenza Immunization not Administered because Patient Refused.
Quality 226: Preventive Care And Screening: Tobacco Use: Screening And Cessation Intervention: Patient screened for tobacco use and is an ex/non-smoker
Normal rate, regular rhythm, normal S1, S2 heart sounds heard.

## 2022-05-11 PROBLEM — J45.909 UNSPECIFIED ASTHMA, UNCOMPLICATED: Chronic | Status: ACTIVE | Noted: 2021-08-23

## 2022-05-23 ENCOUNTER — APPOINTMENT (OUTPATIENT)
Dept: VASCULAR SURGERY | Facility: CLINIC | Age: 67
End: 2022-05-23
Payer: MEDICARE

## 2022-05-23 VITALS
DIASTOLIC BLOOD PRESSURE: 96 MMHG | SYSTOLIC BLOOD PRESSURE: 162 MMHG | BODY MASS INDEX: 41.54 KG/M2 | HEIGHT: 61 IN | TEMPERATURE: 96.5 F | WEIGHT: 220 LBS | HEART RATE: 80 BPM

## 2022-05-23 PROCEDURE — 93970 EXTREMITY STUDY: CPT

## 2022-05-23 PROCEDURE — 99204 OFFICE O/P NEW MOD 45 MIN: CPT

## 2022-05-23 PROCEDURE — 93922 UPR/L XTREMITY ART 2 LEVELS: CPT

## 2022-05-23 NOTE — HISTORY OF PRESENT ILLNESS
[FreeTextEntry1] : I have just had the pleasure of seeing Mrs. Rhianna Taylor in consultation for bilateral lower extremity edema and pain.\par \par Mrs. Taylor is a angy Malagasy-speaking 66-year-old lady who presents with a two month history of bilateral lower extremity edema and pain affecting the calves. She denies any inciting events such as periods of immobility, travel or trauma. She denies any history of DVT or SVT. She formerly worked as a care giver. She has never worn compression stockings. She report pain in the calves when ambulating for more than ten minutes. She also reports pain in the legs that wakes her up at night. She denies any history of tissue loss. She has not had any prior vascular surgical intervention on her lower extremities. .\par \par She denies any history of CAD, MI, CHF, CVA, TIA, CRI, or DM. \par \par Her medical history is otherwise significant for HTN, GERD, OA, COVID-19 (2021, mild symptoms), CAROLINE, interstitial lung disease, recurrent UTI, HLD, and obesity\par \par Medications: Losartan and Simvastatin\par \par Allergies: NDKA\par \par Social history: Non-smoker\par \par FH: NC\par

## 2022-05-23 NOTE — ASSESSMENT
[FreeTextEntry1] : In summary, Mrs. Taylor presents with bilateral leg edema and pain. Exercise LAURA were performed and were within normal limits. A venous duplex was performed and showed reflux in bilateral great saphenous veins. I provided her a prescription for knee high 20-30 mmHg compression stockings and instructed her to wear these daily and remove at night. She will follow up in three months and if symptoms do not improve we will schedule her for ablation of her saphenous veins. \par \par Thank you for allowing me to participate in the care of this nice lady. Please do not hesitate to contact me with any questions.

## 2022-05-23 NOTE — PHYSICAL EXAM
[de-identified] : On physical examination the patient is in no acute distress and neurologically intact. The lungs are clear to auscultation and the heart has a regular rate and rhythm. Abdomen is benign. Bilateral femoral and DP pulses are palpable. PT pulses are difficult to appreciate due to edema. Non-pitting edema below knee level. No wounds or skin changes present.

## 2022-08-05 NOTE — ED PROVIDER NOTE - NS ED MD EM SELECTION
79846 Comprehensive Topical Sulfur Applications Pregnancy And Lactation Text: This medication is considered safe during pregnancy and breast feeding secondary to limited systemic absorption.

## 2022-08-22 NOTE — ED ADULT NURSE NOTE - CAS TRG GEN SKIN CONDITION
Since I last saw this patient there have been no changes in interval H&P.     PE:  Blood pressure (!) 202/94, pulse 68, temperature 97.5 °F (36.4 °C), temperature source Temporal, resp. rate 16, height 5' 9\" (1.753 m), weight 74.7 kg (164 lb 10.9 oz), SpO2 99 %.  Heart: Regular  Lungs: Clear  Abd: soft, non-tender    A/P: left radiocephalic AV fistula and lap assisted PD catheter placement.    Rationale, risks, benefits and alternatives to surgery were discussed.  All questions were answered.  Patient agrees with plan and consents to proceed with surgery.      Warm

## 2022-09-12 ENCOUNTER — APPOINTMENT (OUTPATIENT)
Dept: VASCULAR SURGERY | Facility: CLINIC | Age: 67
End: 2022-09-12

## 2023-05-05 NOTE — ED ADULT NURSE NOTE - CINV DISCH TEACH PARTICIP
PAST MEDICAL HISTORY:  Abdominal hernia in 2012 7/23/19 ; pt states hernia repair 2008, 2018    Acid Reflux     Alcohol abuse--quit 8 years ago--goes to AA ADDENDUM:  at PAST appointment  patient states she quit alcohol approximately 2003    Anxiety     Arthritis     Asthma last rescue inhaler use "maybe 3 years ago when I had the flu or a cold"    b/l  Carpal tunnel syndrome tx PT/OT    Breast cancer 2012  right breast -- had mastectomy and then post op chemo and RT, followed with Herceptin for 1 year  2012 last chemo   2013 may last Herceptin  2013 last RT    Depression     Diabetes mellitus     Diverticulosis     Drug abuse--quit 8 years ago--goes to AA ADDENDUM: at PAST appointment, patient states she quit alcohol in approximately 2003    ETOH abuse states she quit alcohol in 2003    Fibromyalgia     Former smoker     h/o   Fatty liver     h/o b/l fungal foot infection 7/23./19 pt denies    herniated lumbar disc     hiatal hernia last endoscopy 1.5 years ago    History  Uterine Fibroid s/p Hysterectomy 7/02    History of COPD     Hypercholesterolemia 7/23/19 ; pt reports improvement ; pt denies rx    Hypertension     Insomnia     Lymphedema of right arm     Lymphedema, limb right side s/p right mastectomy    Miscarriage x 2    Morbid obesity     Neuropathy b/l feet    Primary osteoarthritis of right knee     sickel cell anemia trait     Sickle cell trait     Sleep apnea diagnosed 2007---supposed to use device but doesn't     Substance abuse quit in 2003 -- cocaine and marijuana    Thyroid nodule had negative biopsy     Patient

## 2023-05-19 ENCOUNTER — EMERGENCY (EMERGENCY)
Facility: HOSPITAL | Age: 68
LOS: 1 days | Discharge: HOME | End: 2023-05-19
Attending: EMERGENCY MEDICINE
Payer: COMMERCIAL

## 2023-05-19 VITALS
WEIGHT: 220.02 LBS | RESPIRATION RATE: 18 BRPM | DIASTOLIC BLOOD PRESSURE: 85 MMHG | HEIGHT: 61 IN | HEART RATE: 85 BPM | OXYGEN SATURATION: 96 % | SYSTOLIC BLOOD PRESSURE: 155 MMHG | TEMPERATURE: 98 F

## 2023-05-19 VITALS
HEART RATE: 89 BPM | DIASTOLIC BLOOD PRESSURE: 73 MMHG | RESPIRATION RATE: 18 BRPM | OXYGEN SATURATION: 99 % | SYSTOLIC BLOOD PRESSURE: 152 MMHG | TEMPERATURE: 98 F

## 2023-05-19 DIAGNOSIS — Z90.49 ACQUIRED ABSENCE OF OTHER SPECIFIED PARTS OF DIGESTIVE TRACT: Chronic | ICD-10-CM

## 2023-05-19 LAB
ALBUMIN SERPL ELPH-MCNC: 4.5 G/DL — SIGNIFICANT CHANGE UP (ref 3.3–5)
ALP SERPL-CCNC: 74 U/L — SIGNIFICANT CHANGE UP (ref 40–120)
ALT FLD-CCNC: 32 U/L — SIGNIFICANT CHANGE UP (ref 10–45)
ANION GAP SERPL CALC-SCNC: 13 MMOL/L — SIGNIFICANT CHANGE UP (ref 5–17)
APPEARANCE UR: CLEAR — SIGNIFICANT CHANGE UP
AST SERPL-CCNC: 24 U/L — SIGNIFICANT CHANGE UP (ref 10–40)
BACTERIA # UR AUTO: NEGATIVE — SIGNIFICANT CHANGE UP
BASOPHILS # BLD AUTO: 0.05 K/UL — SIGNIFICANT CHANGE UP (ref 0–0.2)
BASOPHILS NFR BLD AUTO: 0.7 % — SIGNIFICANT CHANGE UP (ref 0–2)
BILIRUB DIRECT SERPL-MCNC: 0.1 MG/DL — SIGNIFICANT CHANGE UP (ref 0–0.3)
BILIRUB INDIRECT FLD-MCNC: 0.2 MG/DL — SIGNIFICANT CHANGE UP (ref 0.2–1)
BILIRUB SERPL-MCNC: 0.3 MG/DL — SIGNIFICANT CHANGE UP (ref 0.2–1.2)
BILIRUB SERPL-MCNC: 0.3 MG/DL — SIGNIFICANT CHANGE UP (ref 0.2–1.2)
BILIRUB UR-MCNC: NEGATIVE — SIGNIFICANT CHANGE UP
BUN SERPL-MCNC: 12 MG/DL — SIGNIFICANT CHANGE UP (ref 7–23)
CALCIUM SERPL-MCNC: 9.6 MG/DL — SIGNIFICANT CHANGE UP (ref 8.4–10.5)
CHLORIDE SERPL-SCNC: 103 MMOL/L — SIGNIFICANT CHANGE UP (ref 96–108)
CK SERPL-CCNC: 78 U/L — SIGNIFICANT CHANGE UP (ref 25–170)
CO2 SERPL-SCNC: 25 MMOL/L — SIGNIFICANT CHANGE UP (ref 22–31)
COLOR SPEC: COLORLESS — SIGNIFICANT CHANGE UP
CREAT SERPL-MCNC: 0.69 MG/DL — SIGNIFICANT CHANGE UP (ref 0.5–1.3)
DIFF PNL FLD: ABNORMAL
EGFR: 94 ML/MIN/1.73M2 — SIGNIFICANT CHANGE UP
EOSINOPHIL # BLD AUTO: 0.18 K/UL — SIGNIFICANT CHANGE UP (ref 0–0.5)
EOSINOPHIL NFR BLD AUTO: 2.6 % — SIGNIFICANT CHANGE UP (ref 0–6)
EPI CELLS # UR: 4 /HPF — SIGNIFICANT CHANGE UP
GLUCOSE SERPL-MCNC: 96 MG/DL — SIGNIFICANT CHANGE UP (ref 70–99)
GLUCOSE UR QL: NEGATIVE — SIGNIFICANT CHANGE UP
HCT VFR BLD CALC: 44.1 % — SIGNIFICANT CHANGE UP (ref 34.5–45)
HGB BLD-MCNC: 14.8 G/DL — SIGNIFICANT CHANGE UP (ref 11.5–15.5)
HYALINE CASTS # UR AUTO: 0 /LPF — SIGNIFICANT CHANGE UP (ref 0–2)
IMM GRANULOCYTES NFR BLD AUTO: 0.3 % — SIGNIFICANT CHANGE UP (ref 0–0.9)
KETONES UR-MCNC: NEGATIVE — SIGNIFICANT CHANGE UP
LEUKOCYTE ESTERASE UR-ACNC: ABNORMAL
LYMPHOCYTES # BLD AUTO: 1.58 K/UL — SIGNIFICANT CHANGE UP (ref 1–3.3)
LYMPHOCYTES # BLD AUTO: 22.8 % — SIGNIFICANT CHANGE UP (ref 13–44)
MCHC RBC-ENTMCNC: 29.4 PG — SIGNIFICANT CHANGE UP (ref 27–34)
MCHC RBC-ENTMCNC: 33.6 GM/DL — SIGNIFICANT CHANGE UP (ref 32–36)
MCV RBC AUTO: 87.7 FL — SIGNIFICANT CHANGE UP (ref 80–100)
MONOCYTES # BLD AUTO: 0.51 K/UL — SIGNIFICANT CHANGE UP (ref 0–0.9)
MONOCYTES NFR BLD AUTO: 7.3 % — SIGNIFICANT CHANGE UP (ref 2–14)
NEUTROPHILS # BLD AUTO: 4.6 K/UL — SIGNIFICANT CHANGE UP (ref 1.8–7.4)
NEUTROPHILS NFR BLD AUTO: 66.3 % — SIGNIFICANT CHANGE UP (ref 43–77)
NITRITE UR-MCNC: NEGATIVE — SIGNIFICANT CHANGE UP
NRBC # BLD: 0 /100 WBCS — SIGNIFICANT CHANGE UP (ref 0–0)
PH UR: 6 — SIGNIFICANT CHANGE UP (ref 5–8)
PLATELET # BLD AUTO: 213 K/UL — SIGNIFICANT CHANGE UP (ref 150–400)
POTASSIUM SERPL-MCNC: 4 MMOL/L — SIGNIFICANT CHANGE UP (ref 3.5–5.3)
POTASSIUM SERPL-SCNC: 4 MMOL/L — SIGNIFICANT CHANGE UP (ref 3.5–5.3)
PROT SERPL-MCNC: 7.7 G/DL — SIGNIFICANT CHANGE UP (ref 6–8.3)
PROT UR-MCNC: NEGATIVE — SIGNIFICANT CHANGE UP
RBC # BLD: 5.03 M/UL — SIGNIFICANT CHANGE UP (ref 3.8–5.2)
RBC # FLD: 12.3 % — SIGNIFICANT CHANGE UP (ref 10.3–14.5)
RBC CASTS # UR COMP ASSIST: 2 /HPF — SIGNIFICANT CHANGE UP (ref 0–4)
SODIUM SERPL-SCNC: 141 MMOL/L — SIGNIFICANT CHANGE UP (ref 135–145)
SP GR SPEC: 1 — LOW (ref 1.01–1.02)
UROBILINOGEN FLD QL: NEGATIVE — SIGNIFICANT CHANGE UP
WBC # BLD: 6.94 K/UL — SIGNIFICANT CHANGE UP (ref 3.8–10.5)
WBC # FLD AUTO: 6.94 K/UL — SIGNIFICANT CHANGE UP (ref 3.8–10.5)
WBC UR QL: 34 /HPF — HIGH (ref 0–5)

## 2023-05-19 PROCEDURE — 87186 SC STD MICRODIL/AGAR DIL: CPT

## 2023-05-19 PROCEDURE — 36415 COLL VENOUS BLD VENIPUNCTURE: CPT

## 2023-05-19 PROCEDURE — 82248 BILIRUBIN DIRECT: CPT

## 2023-05-19 PROCEDURE — 80053 COMPREHEN METABOLIC PANEL: CPT

## 2023-05-19 PROCEDURE — 81001 URINALYSIS AUTO W/SCOPE: CPT

## 2023-05-19 PROCEDURE — 82247 BILIRUBIN TOTAL: CPT

## 2023-05-19 PROCEDURE — 85025 COMPLETE CBC W/AUTO DIFF WBC: CPT

## 2023-05-19 PROCEDURE — 82550 ASSAY OF CK (CPK): CPT

## 2023-05-19 PROCEDURE — 99283 EMERGENCY DEPT VISIT LOW MDM: CPT

## 2023-05-19 PROCEDURE — 99284 EMERGENCY DEPT VISIT MOD MDM: CPT

## 2023-05-19 PROCEDURE — 87086 URINE CULTURE/COLONY COUNT: CPT

## 2023-05-19 RX ORDER — PHENAZOPYRIDINE HCL 100 MG
100 TABLET ORAL ONCE
Refills: 0 | Status: COMPLETED | OUTPATIENT
Start: 2023-05-19 | End: 2023-05-19

## 2023-05-19 RX ORDER — ACETAMINOPHEN 500 MG
975 TABLET ORAL ONCE
Refills: 0 | Status: COMPLETED | OUTPATIENT
Start: 2023-05-19 | End: 2023-05-19

## 2023-05-19 RX ORDER — PHENAZOPYRIDINE HCL 100 MG
1 TABLET ORAL
Qty: 6 | Refills: 0
Start: 2023-05-19 | End: 2023-05-20

## 2023-05-19 RX ORDER — DIPHENHYDRAMINE HCL 50 MG
25 CAPSULE ORAL ONCE
Refills: 0 | Status: COMPLETED | OUTPATIENT
Start: 2023-05-19 | End: 2023-05-19

## 2023-05-19 RX ORDER — SODIUM CHLORIDE 9 MG/ML
1000 INJECTION INTRAMUSCULAR; INTRAVENOUS; SUBCUTANEOUS ONCE
Refills: 0 | Status: COMPLETED | OUTPATIENT
Start: 2023-05-19 | End: 2023-05-19

## 2023-05-19 RX ORDER — GABAPENTIN 400 MG/1
100 CAPSULE ORAL ONCE
Refills: 0 | Status: COMPLETED | OUTPATIENT
Start: 2023-05-19 | End: 2023-05-19

## 2023-05-19 RX ORDER — PHENAZOPYRIDINE HCL 100 MG
2 TABLET ORAL
Qty: 12 | Refills: 0
Start: 2023-05-19 | End: 2023-05-20

## 2023-05-19 RX ADMIN — GABAPENTIN 100 MILLIGRAM(S): 400 CAPSULE ORAL at 13:29

## 2023-05-19 RX ADMIN — Medication 100 MILLIGRAM(S): at 13:29

## 2023-05-19 RX ADMIN — SODIUM CHLORIDE 1000 MILLILITER(S): 9 INJECTION INTRAMUSCULAR; INTRAVENOUS; SUBCUTANEOUS at 13:35

## 2023-05-19 RX ADMIN — Medication 25 MILLIGRAM(S): at 13:30

## 2023-05-19 RX ADMIN — Medication 975 MILLIGRAM(S): at 13:28

## 2023-05-19 RX ADMIN — Medication 1 TABLET(S): at 14:56

## 2023-05-19 NOTE — ED ADULT NURSE NOTE - OBJECTIVE STATEMENT
67y Female AOx4 with PMH of fibromyalgia,  neuopathy presents to the ED with multiple complaints. Pt endorsees x2 days ao generalized bopdy "burning, pain and itching. Now also endorses increased burning with urination and vaginal burning. Denies vaginal discharge or bleeding.  Denies N/V, fever/chills, SOB, chest pain. Spontaneous/unlabored respirations, speaking in full sentences. Side rails up, bed in lowest position, safety maintained.

## 2023-05-19 NOTE — ED PROVIDER NOTE - NSFOLLOWUPINSTRUCTIONS_ED_ALL_ED_FT
Please see the information of UTI (Urinary Track Infection).    Hydrate.    Keep continue your current medications as prescribed.    Take Bactrim for UTI and Pyridium for burning pain as prescribed.    Follow up with your primary Dr. and GYN for reevaluation, call today for appointment.    Return for any concerns, fever, chills, back pain, abdominal pain, [id number], or worsening symptoms.

## 2023-05-19 NOTE — ED ADULT NURSE NOTE - NSFALLUNIVINTERV_ED_ALL_ED
Bed/Stretcher in lowest position, wheels locked, appropriate side rails in place/Call bell, personal items and telephone in reach/Instruct patient to call for assistance before getting out of bed/chair/stretcher/Non-slip footwear applied when patient is off stretcher/[place] to call system/Physically safe environment - no spills, clutter or unnecessary equipment/Purposeful proactive rounding/Room/bathroom lighting operational, light cord in reach

## 2023-05-19 NOTE — ED PROVIDER NOTE - ATTENDING APP SHARED VISIT CONTRIBUTION OF CARE
Dr. Iraheta: I performed a face to face bedside interview with patient regarding history of present illness, review of symptoms and past medical history. I completed an independent physical exam.  I have discussed patient's plan of care with NP. I agree with note as stated above, having amended the EMR as needed to reflect my findings.   This includes HISTORY OF PRESENT ILLNESS, HIV, PAST MEDICAL/SURGICAL/FAMILY/SOCIAL HISTORY, ALLERGIES AND HOME MEDICATIONS, REVIEW OF SYSTEMS, PHYSICAL EXAM, and any PROGRESS NOTES during the time I functioned as the attending physician for this patient.    Dr. Iraheta: This H&P has been written by myself in its entirety
Pt will improve standing balance(S/D) to Normal to increase dynamic activities within 2 weeks.

## 2023-05-19 NOTE — ED PROVIDER NOTE - OBJECTIVE STATEMENT
Dr. Iraheta: 67F h/o fibromyalgia, neuropathy, has been on gabapentin in the past but has not been taking, HLD on a statin, HTN on losartan, chronic UTIs due to positioning of her bladder and uterus as per pt, p/w 2 days of entire body burning pain and itching, along with burning while urinating and vaginal burning. No vaginal discharge or bleeding. No hematuria, fevers, chills, nausea, vomiting, abdo pain. No rashes. Hasn't taken anything for the pain. No new exposures.

## 2023-05-19 NOTE — ED PROVIDER NOTE - NS ED ATTENDING STATEMENT MOD
This was a shared visit with the MARCY. I reviewed and verified the documentation and independently performed the documented:

## 2023-05-19 NOTE — ED ADULT TRIAGE NOTE - CHIEF COMPLAINT QUOTE
Gen itching no rash x4 days Denies anything new C/o pain " burning sensation"  Unable to sleep No meds taken C/o burn upon urination

## 2023-05-19 NOTE — ED PROVIDER NOTE - CLINICAL SUMMARY MEDICAL DECISION MAKING FREE TEXT BOX
Pt with sx as stated above, likely neuropathy, given her meds will check electrolytes to r/o electrolyte abnormality vs rhabdo vs UTI. Will treat symptomatically. Pt with sx as stated above, likely neuropathy, given her meds will check electrolytes to r/o electrolyte abnormality vs rhabdo vs UTI. Will treat symptomatically.  Prior UCx checked, Ecoli sensitive to Bactrim, will dc on Bactrim.

## 2023-05-19 NOTE — ED PROVIDER NOTE - PROGRESS NOTE DETAILS
Using Interpret Service ([id number]): Pt states feeling better after meds. Test results and UTI information given with well understanding.

## 2023-05-19 NOTE — ED PROVIDER NOTE - PATIENT PORTAL LINK FT
You can access the FollowMyHealth Patient Portal offered by [facility] [place] by registering at the following website: [email or web address]. By joining [facility]’s FollowMyHealth portal, you will also be able to view your health information using other applications (apps) compatible with our system.

## 2023-05-19 NOTE — ED PROVIDER NOTE - PHYSICAL EXAMINATION
Gen: No acute distress  HEENT: Mucous membranes moist, pink conjunctivae, EOMI  CV: RRR, no clubbing/cyanosis/edema  Resp: CTAB  GI: Abdomen soft, NT, ND. Normal BS. No rebound, no guarding  : No CVAT  Neuro: A&O x 3, moving all 4 extremities  MSK: No spine or joint tenderness to palpation  Skin: No rashes

## 2023-05-22 NOTE — ED POST DISCHARGE NOTE - DETAILS
5/23/23 REMINGTON JOHN - Left v/m for call back using Gambian interpretation (via [employer] - fluent in American, as the  services unavailable. ) 5/24: Called and spoke with pt. She is not feeling any better. She is still having abdominal pain and dysuria. I told her to stop taking the bactrim. I sent her Vantin which is sensitive as per culture. She has leela with her PCP tomorrow for f/.u. Supportive care discussed. recc motrin/tylenol for pain. Radha Laguna PA-C

## 2023-05-24 RX ORDER — CEFPODOXIME PROXETIL 100 MG
1 TABLET ORAL
Qty: 14 | Refills: 0
Start: 2023-05-24 | End: 2023-05-30

## 2023-05-25 ENCOUNTER — APPOINTMENT (OUTPATIENT)
Dept: INTERNAL MEDICINE | Facility: CLINIC | Age: 68
End: 2023-05-25

## 2023-05-25 ENCOUNTER — APPOINTMENT (OUTPATIENT)
Dept: OBGYN | Facility: CLINIC | Age: 68
End: 2023-05-25
Payer: MEDICARE

## 2023-05-25 VITALS
DIASTOLIC BLOOD PRESSURE: 82 MMHG | SYSTOLIC BLOOD PRESSURE: 136 MMHG | HEIGHT: 61 IN | WEIGHT: 230 LBS | BODY MASS INDEX: 43.43 KG/M2

## 2023-05-25 DIAGNOSIS — R92.2 INCONCLUSIVE MAMMOGRAM: ICD-10-CM

## 2023-05-25 DIAGNOSIS — R10.2 PELVIC AND PERINEAL PAIN: ICD-10-CM

## 2023-05-25 DIAGNOSIS — Z80.49 FAMILY HISTORY OF MALIGNANT NEOPLASM OF OTHER GENITAL ORGANS: ICD-10-CM

## 2023-05-25 DIAGNOSIS — Z01.419 ENCOUNTER FOR GYNECOLOGICAL EXAMINATION (GENERAL) (ROUTINE) W/OUT ABNORMAL FINDINGS: ICD-10-CM

## 2023-05-25 DIAGNOSIS — Z80.41 FAMILY HISTORY OF MALIGNANT NEOPLASM OF OVARY: ICD-10-CM

## 2023-05-25 PROCEDURE — 99387 INIT PM E/M NEW PAT 65+ YRS: CPT

## 2023-06-01 LAB — CYTOLOGY CVX/VAG DOC THIN PREP: ABNORMAL

## 2023-06-05 NOTE — DISCUSSION/SUMMARY
[FreeTextEntry1] : 67-year-old  LMP at age 47 postmenopausal woman no HRT\par Events complaining of recurrent UTIs pelvic pain urinary hesitancy\par Patient had a mesh placed for the bladder has a history of recurrent UTIs pelvic pain for 15 days hospitalized started on antibiotics patient was offered a pessary but declined she is on estrogen cream Pyridium Macrobid\par Patient requesting surgery, urogyn referral\par breast imaging\par Medical genetics cancer screening \par Pap\par f/u 2 years/PRN\par

## 2023-06-05 NOTE — HISTORY OF PRESENT ILLNESS
[FreeTextEntry1] : 67-year-old  LMP at age 47 postmenopausal woman no HRT\par Events complaining of recurrent UTIs pelvic pain urinary hesitancy\par Patient had a mesh placed for the bladder has a history of recurrent UTIs pelvic pain for 15 days hospitalized started on antibiotics patient was offered a pessary but declined she is on estrogen cream Pyridium Macrobid\par no vaginal bleeding\par Patient requesting surgery

## 2023-06-06 ENCOUNTER — NON-APPOINTMENT (OUTPATIENT)
Age: 68
End: 2023-06-06

## 2023-06-06 RX ORDER — FLUCONAZOLE 150 MG/1
150 TABLET ORAL
Qty: 1 | Refills: 0 | Status: ACTIVE | COMMUNITY
Start: 2023-06-06 | End: 1900-01-01

## 2023-06-08 ENCOUNTER — APPOINTMENT (OUTPATIENT)
Dept: UROGYNECOLOGY | Facility: CLINIC | Age: 68
End: 2023-06-08
Payer: MEDICARE

## 2023-06-08 VITALS
BODY MASS INDEX: 42.86 KG/M2 | HEART RATE: 88 BPM | SYSTOLIC BLOOD PRESSURE: 155 MMHG | WEIGHT: 227 LBS | HEIGHT: 61 IN | DIASTOLIC BLOOD PRESSURE: 84 MMHG

## 2023-06-08 LAB
BILIRUB UR QL STRIP: NORMAL
CLARITY UR: CLEAR
COLLECTION METHOD: NORMAL
GLUCOSE UR-MCNC: NORMAL
HCG UR QL: 0.2 EU/DL
HGB UR QL STRIP.AUTO: NORMAL
KETONES UR-MCNC: NORMAL
LEUKOCYTE ESTERASE UR QL STRIP: NORMAL
NITRITE UR QL STRIP: NORMAL
PH UR STRIP: 7
PROT UR STRIP-MCNC: NORMAL
SP GR UR STRIP: 1.01

## 2023-06-08 PROCEDURE — 99205 OFFICE O/P NEW HI 60 MIN: CPT | Mod: 25

## 2023-06-08 PROCEDURE — 51701 INSERT BLADDER CATHETER: CPT

## 2023-06-08 RX ORDER — NITROFURANTOIN MACROCRYSTALS 100 MG/1
100 CAPSULE ORAL DAILY
Qty: 30 | Refills: 2 | Status: ACTIVE | COMMUNITY
Start: 2023-06-08 | End: 1900-01-01

## 2023-06-08 NOTE — HISTORY OF PRESENT ILLNESS
[FreeTextEntry1] : CITLALY LUTZ is a 67 year old P3 presenting for evaluation of recurrent urinary tract infections, urinary frequency and vulvar pain. Reports UTI symptoms include dysuria and bladder pain. For Eleazar, reports was recently treated with Macrobid. She also reports feeling something coming out of the vagina. She has a history of a bladder lift with mesh in the past. She voids more than 5-10 times during the daytime. Reports drinking at least 1.5-2 liters of water daily and 1 cup of coffee in the morning. Reports that she avoids citrus/acidic foods or beverages due to history of gastritis in the past.\par She has been evaluated by urologist in the past for these complaints, both in  and . She has cystoscopy done both time, which were normal and did not show any evidence of mesh/stones, lesions. She also had a CTU in , which was unremarkable. She was recently prescribed Vesicare by her PCP 4 days ago/\par \par PMH: HTN, HLD\par PSH: bladder lift with mesh?\par OBGYN Hx:  x 3\par Social Hx: non-smoker\par

## 2023-06-08 NOTE — PHYSICAL EXAM
[Chaperone Present] : A chaperone was present in the examining room during all aspects of the physical examination [Normal Appearance] : general appearance was normal [Atrophy] : atrophy [Dry Mucosa] : dry mucosa [Aa ____] : Aa [unfilled] [Ba ____] : Ba [unfilled] [C ____] : C [unfilled] [GH ____] : GH [unfilled] [PB ____] : PB [unfilled] [TVL ____] : TVL  [unfilled] [Ap ____] : Ap [unfilled] [Bp ____] : Bp [unfilled] [D ____] : D [unfilled] [Normal] : normal [Post Void Residual ____ml] : post void residual was [unfilled] ml [Exam Deferred] : was deferred [FreeTextEntry1] : General: Well, appearing, no acute distress\par HEENT: Normocephalic, atraumatic\par Respiratory: Speaking in full sentences comfortably, normal work of breathing and no cough during visit\par Extremities: No upper extremity edema noted\par Skin: No obvious rash or skin lesions\par Neuro: Alert and oriented x 3, speech is fluent, normal rate\par Psych: Normal mood and affect\par  [FreeTextEntry3] : protuberant periurethral tissue [FreeTextEntry4] : Tender contraction band in right anterior vaginal wall, likely site of mesh placement

## 2023-06-08 NOTE — DISCUSSION/SUMMARY
[FreeTextEntry1] : #Recurrent UTIs/pelvic pain\par - Will obtain JONNY\par - Had cystoscopy x2 in the past, would not repeat at this time\par - Follow-up UA/UCx\par - Also follow-up TVUS ordered by Dr. Borja\par - Will start Macrobid prophylaxis 50 mg daily\par \par #OAB\par - Continue Vesicare, will re-assess effectiveness at next visit as she just recently starting taking\par \par #Vulvar pain\par - Mycolog sent to pharmacy

## 2023-06-08 NOTE — PROCEDURE
[FreeTextEntry1] : A sterile straight catheterization was performed to measure postvoid residual volume, which was 60 ml.\par

## 2023-06-09 LAB
APPEARANCE: CLEAR
BACTERIA: NEGATIVE /HPF
BILIRUBIN URINE: NEGATIVE
BLOOD URINE: NEGATIVE
CAST: 0 /LPF
COLOR: YELLOW
EPITHELIAL CELLS: 0 /HPF
GLUCOSE QUALITATIVE U: NEGATIVE MG/DL
KETONES URINE: NEGATIVE MG/DL
LEUKOCYTE ESTERASE URINE: NEGATIVE
MICROSCOPIC-UA: NORMAL
NITRITE URINE: NEGATIVE
PH URINE: 7
PROTEIN URINE: NEGATIVE MG/DL
RED BLOOD CELLS URINE: 0 /HPF
SPECIFIC GRAVITY URINE: 1.01
UROBILINOGEN URINE: 0.2 MG/DL
WHITE BLOOD CELLS URINE: 0 /HPF

## 2023-06-12 LAB — BACTERIA UR CULT: NORMAL

## 2023-06-19 ENCOUNTER — OUTPATIENT (OUTPATIENT)
Dept: OUTPATIENT SERVICES | Facility: HOSPITAL | Age: 68
LOS: 1 days | End: 2023-06-19
Payer: COMMERCIAL

## 2023-06-19 ENCOUNTER — APPOINTMENT (OUTPATIENT)
Dept: ULTRASOUND IMAGING | Facility: CLINIC | Age: 68
End: 2023-06-19
Payer: MEDICARE

## 2023-06-19 DIAGNOSIS — N39.0 URINARY TRACT INFECTION, SITE NOT SPECIFIED: ICD-10-CM

## 2023-06-19 DIAGNOSIS — Z90.49 ACQUIRED ABSENCE OF OTHER SPECIFIED PARTS OF DIGESTIVE TRACT: Chronic | ICD-10-CM

## 2023-06-19 PROCEDURE — 76775 US EXAM ABDO BACK WALL LIM: CPT | Mod: 26

## 2023-06-19 PROCEDURE — 76775 US EXAM ABDO BACK WALL LIM: CPT

## 2023-06-27 ENCOUNTER — NON-APPOINTMENT (OUTPATIENT)
Age: 68
End: 2023-06-27

## 2023-07-06 ENCOUNTER — APPOINTMENT (OUTPATIENT)
Dept: UROGYNECOLOGY | Facility: CLINIC | Age: 68
End: 2023-07-06
Payer: MEDICARE

## 2023-07-06 VITALS
HEIGHT: 61 IN | WEIGHT: 230 LBS | SYSTOLIC BLOOD PRESSURE: 148 MMHG | DIASTOLIC BLOOD PRESSURE: 91 MMHG | BODY MASS INDEX: 43.43 KG/M2 | HEART RATE: 82 BPM

## 2023-07-06 LAB
BILIRUB UR QL STRIP: NORMAL
CLARITY UR: CLEAR
COLLECTION METHOD: NORMAL
GLUCOSE UR-MCNC: NORMAL
HCG UR QL: 0.2 EU/DL
HGB UR QL STRIP.AUTO: NORMAL
KETONES UR-MCNC: NORMAL
LEUKOCYTE ESTERASE UR QL STRIP: NORMAL
NITRITE UR QL STRIP: NORMAL
PH UR STRIP: 5.5
PROT UR STRIP-MCNC: NORMAL
SP GR UR STRIP: 1

## 2023-07-06 PROCEDURE — 99214 OFFICE O/P EST MOD 30 MIN: CPT

## 2023-07-06 NOTE — REASON FOR VISIT
[Follow-Up Visit_____] : a follow-up visit for [unfilled] [[employer] ] : provided by [employer]   [Time Spent: ____ minutes] : Total time spent using  services: [unfilled] minutes. The patient's primary language is not English thus required  services. [Interpreters_IDNumber] : [id number] [Interpreters_FullName] : Toy [TWNoteComboBox1] : Egyptian

## 2023-07-06 NOTE — HISTORY OF PRESENT ILLNESS
[FreeTextEntry1] : 68 yo Korean-speaking patient presents today for follow up of LUTS. She reports that she is no longer feeling dysuria, however is mostly bothered by urinary urgency, frequency. Reports that she experiences significant pressure and discomfort in the bladder with the urgency and this improves after voiding. She has been using Vesicare without improvement of her symptoms. She also reports vaginal/labial pain occasionally. At last visit we had discussed started daily prophylactic antibiotics for Eleazar. Today she reports that she has not been using it. She was also previously started on vaginal estrogen cream, but stopped using it since she switched providers.

## 2023-07-06 NOTE — DISCUSSION/SUMMARY
[FreeTextEntry1] : Pt with history of recurrent UTIs and overactive bladder. Reports significant discomfort with urgency when her bladder is full, which is relieved with urination. Symptoms may be attributable to overactive bladder versus bladder pain syndrome. We discussed eliminating bladder irritants from diet (drinks one cup of coffee daily). Also recommend discontinuing Vesicare and trial of Gemtesa. Rx sent to pharmacy. Will RTO in 4 weeks to monitor response. Advised to restart using vaginal estrogen cream 3 times weekly.

## 2023-07-07 ENCOUNTER — NON-APPOINTMENT (OUTPATIENT)
Age: 68
End: 2023-07-07

## 2023-07-07 LAB
APPEARANCE: CLEAR
BACTERIA UR CULT: NORMAL
BACTERIA: NEGATIVE /HPF
BILIRUBIN URINE: NEGATIVE
BLOOD URINE: ABNORMAL
CAST: 0 /LPF
COLOR: YELLOW
EPITHELIAL CELLS: 0 /HPF
GLUCOSE QUALITATIVE U: NEGATIVE MG/DL
KETONES URINE: NEGATIVE MG/DL
LEUKOCYTE ESTERASE URINE: NEGATIVE
MICROSCOPIC-UA: NORMAL
NITRITE URINE: NEGATIVE
PH URINE: 6
PROTEIN URINE: NEGATIVE MG/DL
RED BLOOD CELLS URINE: 0 /HPF
SPECIFIC GRAVITY URINE: 1.01
UROBILINOGEN URINE: 0.2 MG/DL
WHITE BLOOD CELLS URINE: 0 /HPF

## 2023-07-26 ENCOUNTER — APPOINTMENT (OUTPATIENT)
Dept: UROGYNECOLOGY | Facility: CLINIC | Age: 68
End: 2023-07-26
Payer: MEDICARE

## 2023-07-26 LAB
BILIRUB UR QL STRIP: NORMAL
CLARITY UR: NORMAL
COLLECTION METHOD: NORMAL
GLUCOSE UR-MCNC: NORMAL
HCG UR QL: 0.2 EU/DL
HGB UR QL STRIP.AUTO: NORMAL
KETONES UR-MCNC: NORMAL
LEUKOCYTE ESTERASE UR QL STRIP: NORMAL
NITRITE UR QL STRIP: POSITIVE
PH UR STRIP: 5
PROT UR STRIP-MCNC: NORMAL
SP GR UR STRIP: 1.03

## 2023-07-26 PROCEDURE — 99214 OFFICE O/P EST MOD 30 MIN: CPT

## 2023-07-26 RX ORDER — VIBEGRON 75 MG/1
75 TABLET, FILM COATED ORAL
Qty: 30 | Refills: 3 | Status: ACTIVE | COMMUNITY
Start: 2023-07-06 | End: 1900-01-01

## 2023-07-26 RX ORDER — NYSTATIN AND TRIAMCINOLONE ACETONIDE 100000; 1 MG/G; MG/G
100000-0.1 CREAM TOPICAL TWICE DAILY
Qty: 1 | Refills: 0 | Status: ACTIVE | COMMUNITY
Start: 2023-06-08 | End: 1900-01-01

## 2023-07-26 RX ORDER — SULFAMETHOXAZOLE AND TRIMETHOPRIM 800; 160 MG/1; MG/1
800-160 TABLET ORAL TWICE DAILY
Qty: 6 | Refills: 0 | Status: ACTIVE | COMMUNITY
Start: 2023-07-26 | End: 1900-01-01

## 2023-07-26 NOTE — DISCUSSION/SUMMARY
[facility]  GROUP DOCUMENTATION INDIVIDUAL Group Therapy Note Date: 2/19/2021 Group Start Time: 0830 Group End Time: 0900 Group Topic: Education Group - Inpatient [facility] [place] 
 
[facility]  GROUP DOCUMENTATION GROUP Group Therapy Note SW had discharge planning with patients. Self-care including follow-up with psychiatric appointments, medication, and counseling were discussed. Attendance: Attended Patient's Goal:  Discharge Interventions/techniques: Informed and Supported Follows Directions: Followed directions Interactions: Interacted appropriately Mental Status: Calm and Congruent Behavior/appearance: Attentive and Cooperative Goals Achieved: Able to engage in interactions and Discussed discharge plans Additional Notes:  Discharging home with family Monday. Mother will pick him up. He will re-establish care with [facility]. TAYLOR Novak, Supervisee in Social Work [FreeTextEntry1] : #Dysuria\par - Urine dipstick positive for leukocytes/nitrites\par - Given symptoms, will empirically treat with Bactrim DS BID x 3 days, Rx sent to pharmacy as patient is traveling tomorrow for 2 weeks.\par \par #Vulvar pain\par - Unclear etiology of vulvar pain, grossly normal exam today\par - Patient states that she never received Rx for Mycolog, new Rx\par - Will re-evaluate at next visit\par \par #OAB\par - Has significant improvement with Gemtesa.\par - Continue current management, refill Rx sent to pharmacy\par \par RTO in 3-4 weeks

## 2023-07-26 NOTE — HISTORY OF PRESENT ILLNESS
[FreeTextEntry1] : Pt presents for follow up of OAB. She was switched from Vesicare to Gemtesa at last visit. Today, she reports that she was doing well with new medication, however has been experiencing vulvar pain, which is worse today. Pain occurs mainly when she is very active or standing for long periods of time. Improves with rest and Tylenol. Also has started feeling some burning with urination today. Otherwise, she feels that Gemtesa has been very effective in managing her OAB symptoms.

## 2023-08-01 LAB
APPEARANCE: ABNORMAL
BACTERIA: ABNORMAL /HPF
BILIRUBIN URINE: NEGATIVE
BLOOD URINE: ABNORMAL
CAST: 1 /LPF
COLOR: YELLOW
EPITHELIAL CELLS: 3 /HPF
GLUCOSE QUALITATIVE U: NEGATIVE MG/DL
KETONES URINE: NEGATIVE MG/DL
LEUKOCYTE ESTERASE URINE: ABNORMAL
MICROSCOPIC-UA: NORMAL
NITRITE URINE: POSITIVE
PH URINE: 5.5
PROTEIN URINE: NORMAL MG/DL
RED BLOOD CELLS URINE: 1 /HPF
REVIEW: NORMAL
SPECIFIC GRAVITY URINE: 1.02
UROBILINOGEN URINE: 0.2 MG/DL
WHITE BLOOD CELLS URINE: 163 /HPF

## 2023-08-23 ENCOUNTER — APPOINTMENT (OUTPATIENT)
Dept: UROGYNECOLOGY | Facility: CLINIC | Age: 68
End: 2023-08-23
Payer: MEDICARE

## 2023-08-23 VITALS
BODY MASS INDEX: 43.43 KG/M2 | SYSTOLIC BLOOD PRESSURE: 140 MMHG | HEIGHT: 61 IN | WEIGHT: 230 LBS | DIASTOLIC BLOOD PRESSURE: 76 MMHG

## 2023-08-23 DIAGNOSIS — R30.0 DYSURIA: ICD-10-CM

## 2023-08-23 PROCEDURE — 99213 OFFICE O/P EST LOW 20 MIN: CPT

## 2023-08-23 NOTE — DISCUSSION/SUMMARY
[FreeTextEntry1] : #OAB - Continue current management with Gemtesa 75mg daily.  #Eleazar - Will repeat urine culture today given mild persistent dysuria and recently inadequately treated infection. - Will await culture results prior to further treatment.  #Vulvar itching - Improved with Mycolog. - Continue use PRN.  I asked the patient to return in 3 months.

## 2023-08-23 NOTE — PHYSICAL EXAM
[FreeTextEntry1] : General: Well, appearing, no acute distress HEENT: Normocephalic, atraumatic Respiratory: Speaking in full sentences comfortably, normal work of breathing and no cough during visit Extremities: No upper extremity edema noted Skin: No obvious rash or skin lesions Neuro: Alert and oriented x 3, speech is fluent, normal rate Psych: Normal mood and affect

## 2023-08-23 NOTE — HISTORY OF PRESENT ILLNESS
[FreeTextEntry1] : Rhianna presents for follow up of OAB, vulvar irritation and recent UTI. At last visit she was treated empirically with Bactrim, however culture returned showing resistance to this antibiotic. We were unable to reach her to revise treatment due to her traveling out of the [place]. Today, she returns reports significant improvement in her symptoms, however, still has some dysuria occasionally. Vulvar irritation improved with Mycolog. She continues feel that the Gemtesa is helping with OAB symptoms.

## 2023-08-28 RX ORDER — CEPHALEXIN 500 MG/1
500 CAPSULE ORAL
Qty: 14 | Refills: 0 | Status: ACTIVE | COMMUNITY
Start: 2023-08-28 | End: 1900-01-01

## 2023-09-08 ENCOUNTER — APPOINTMENT (OUTPATIENT)
Dept: UROGYNECOLOGY | Facility: CLINIC | Age: 68
End: 2023-09-08
Payer: MEDICARE

## 2023-09-08 VITALS
HEIGHT: 61 IN | WEIGHT: 230 LBS | DIASTOLIC BLOOD PRESSURE: 78 MMHG | BODY MASS INDEX: 43.43 KG/M2 | SYSTOLIC BLOOD PRESSURE: 138 MMHG

## 2023-09-08 DIAGNOSIS — R10.2 PELVIC AND PERINEAL PAIN: ICD-10-CM

## 2023-09-08 DIAGNOSIS — N32.81 OVERACTIVE BLADDER: ICD-10-CM

## 2023-09-08 DIAGNOSIS — R39.89 OTHER SYMPTOMS AND SIGNS INVOLVING THE GENITOURINARY SYSTEM: ICD-10-CM

## 2023-09-08 LAB
BILIRUB UR QL STRIP: NORMAL
CLARITY UR: CLEAR
COLLECTION METHOD: NORMAL
GLUCOSE UR-MCNC: NORMAL
HCG UR QL: 0.2 EU/DL
HGB UR QL STRIP.AUTO: NORMAL
KETONES UR-MCNC: NORMAL
LEUKOCYTE ESTERASE UR QL STRIP: NORMAL
NITRITE UR QL STRIP: NORMAL
PH UR STRIP: 5.5
PROT UR STRIP-MCNC: NORMAL
SP GR UR STRIP: 1.01

## 2023-09-08 PROCEDURE — 99214 OFFICE O/P EST MOD 30 MIN: CPT | Mod: 25

## 2023-09-08 PROCEDURE — 81003 URINALYSIS AUTO W/O SCOPE: CPT | Mod: QW

## 2023-09-08 RX ORDER — PHENAZOPYRIDINE HYDROCHLORIDE 200 MG/1
200 TABLET ORAL 3 TIMES DAILY
Qty: 12 | Refills: 0 | Status: ACTIVE | COMMUNITY
Start: 2023-09-08 | End: 1900-01-01

## 2023-09-08 NOTE — HISTORY OF PRESENT ILLNESS
[FreeTextEntry1] : Rhianna, 68y/o presents with c/o vulvar irritation and recurrent UTI.  At last visit she was treated Cephalexin and prior to last appt, she was empirically with Bactrim, however culture returned showing resistance to this antibiotic. Unable to reach pt to revise treatment due to her traveling out of the country.  PT continues to have symptoms and is having a lot of discomfort in her bladder and vaginal area.  She said she have been on multiple abx and been to several specialist and no resolutions.  She is on Gemtesa 75mg for her OAB.  She is using Estradiol cream as Rx by her GYN.  Her vaginal area burns and when she urinates, it's worse.  She is on Mycolog and unsure if this is helping her.  She c/o of pelvic pressure/pain and lower back pain.  She is also on Diclofenac for pain and it helps a little.  Denies any fever, chills, or blood in urine.  PT did mention she had blood in her urine in the past.

## 2023-09-08 NOTE — PHYSICAL EXAM
[No Acute Distress] : in no acute distress [Well developed] : well developed [Well Nourished] : ~L well nourished [Good Hygeine] : demonstrates good hygeine [Oriented x3] : oriented to person, place, and time [Normal Memory] : ~T memory was ~L unimpaired [Normal Mood/Affect] : mood and affect are normal [Anxiety] : patient is anxious [Obese] : obese [Warm and Dry] : was warm and dry to touch [Normal Gait] : gait was normal [Vulvar Atrophy] : vulvar atrophy [Labia Majora Erythema] : erythema [Dry Mucosa] : dry mucosa [de-identified] : Moderate erythema and irritated.  Very dry mucosa.  pt uncomfortable with touch. [FreeTextEntry3] : inflamed

## 2023-09-08 NOTE — REASON FOR VISIT
[Follow-Up Visit_____] : a follow-up visit for [unfilled] [Family Member] : family member [Declined  Services] : Patient was offered free  services in ~his/her~ preferred language and declined services. Patient insisted on family member providing interpretation   [Source: ______] : History obtained from [unfilled]

## 2023-09-08 NOTE — COUNSELING
[FreeTextEntry1] : Urine obtained via clean catch as unable to obtain with cath due to severe vaginal atrophy.

## 2023-09-08 NOTE — DISCUSSION/SUMMARY
[FreeTextEntry1] : OAB: - Continue current management with Gemtesa 75mg daily.  Recurrent UTI: - Urine dip in office - trace blood. -Formal UA and CS sent. -For dysuria, treating empirically with Phenazopyridine.  Vulvar irritation: - May continue with Mycolog. - PT may use generous amount of A&D ointment, Aquaphor to vulva and labia daily. -She can also use Replens or Luvena vaginal lubrication.  Written instructions were provided.  Briefly discussed MH in urine.  WIll wait for results of UA and CS.  Vaginal atrophy: -She will continue on the Estradiol cream as Rx by her GYN TIW. -Discussed in depth regarding pt's condition.    She will keep her appt with Dr. Vicente or call to come in sooner if symptoms persist.  IF pt have any problems/concern to call office.  PT verbalizes understanding and agrees.

## 2023-09-11 LAB
APPEARANCE: CLEAR
BACTERIA: NEGATIVE /HPF
BILIRUBIN URINE: NEGATIVE
BLOOD URINE: ABNORMAL
CAST: 0 /LPF
COLOR: YELLOW
EPITHELIAL CELLS: 0 /HPF
GLUCOSE QUALITATIVE U: NEGATIVE MG/DL
KETONES URINE: NEGATIVE MG/DL
LEUKOCYTE ESTERASE URINE: NEGATIVE
MICROSCOPIC-UA: NORMAL
NITRITE URINE: NEGATIVE
PH URINE: 6
PROTEIN URINE: NEGATIVE MG/DL
RED BLOOD CELLS URINE: 1 /HPF
SPECIFIC GRAVITY URINE: 1.01
UROBILINOGEN URINE: 0.2 MG/DL
WHITE BLOOD CELLS URINE: 0 /HPF

## 2023-11-17 ENCOUNTER — EMERGENCY (EMERGENCY)
Facility: HOSPITAL | Age: 68
LOS: 1 days | Discharge: HOME | End: 2023-11-17
Attending: EMERGENCY MEDICINE
Payer: COMMERCIAL

## 2023-11-17 VITALS
OXYGEN SATURATION: 97 % | HEIGHT: 61 IN | SYSTOLIC BLOOD PRESSURE: 140 MMHG | HEART RATE: 90 BPM | DIASTOLIC BLOOD PRESSURE: 88 MMHG | WEIGHT: 229.94 LBS | TEMPERATURE: 99 F | RESPIRATION RATE: 18 BRPM

## 2023-11-17 DIAGNOSIS — Z90.49 ACQUIRED ABSENCE OF OTHER SPECIFIED PARTS OF DIGESTIVE TRACT: Chronic | ICD-10-CM

## 2023-11-17 PROCEDURE — 99285 EMERGENCY DEPT VISIT HI MDM: CPT | Mod: 25

## 2023-11-18 VITALS
RESPIRATION RATE: 18 BRPM | SYSTOLIC BLOOD PRESSURE: 120 MMHG | DIASTOLIC BLOOD PRESSURE: 82 MMHG | HEART RATE: 75 BPM | OXYGEN SATURATION: 97 %

## 2023-11-18 LAB
ALBUMIN SERPL ELPH-MCNC: 4.4 G/DL — SIGNIFICANT CHANGE UP (ref 3.3–5)
ALBUMIN SERPL ELPH-MCNC: 4.4 G/DL — SIGNIFICANT CHANGE UP (ref 3.3–5)
ALP SERPL-CCNC: 74 U/L — SIGNIFICANT CHANGE UP (ref 40–120)
ALP SERPL-CCNC: 74 U/L — SIGNIFICANT CHANGE UP (ref 40–120)
ALT FLD-CCNC: 35 U/L — SIGNIFICANT CHANGE UP (ref 10–45)
ALT FLD-CCNC: 35 U/L — SIGNIFICANT CHANGE UP (ref 10–45)
APPEARANCE UR: CLEAR — SIGNIFICANT CHANGE UP
APPEARANCE UR: CLEAR — SIGNIFICANT CHANGE UP
AST SERPL-CCNC: 26 U/L — SIGNIFICANT CHANGE UP (ref 10–40)
AST SERPL-CCNC: 26 U/L — SIGNIFICANT CHANGE UP (ref 10–40)
BACTERIA # UR AUTO: NEGATIVE /HPF — SIGNIFICANT CHANGE UP
BACTERIA # UR AUTO: NEGATIVE /HPF — SIGNIFICANT CHANGE UP
BASOPHILS # BLD AUTO: 0.04 K/UL — SIGNIFICANT CHANGE UP (ref 0–0.2)
BASOPHILS # BLD AUTO: 0.04 K/UL — SIGNIFICANT CHANGE UP (ref 0–0.2)
BASOPHILS NFR BLD AUTO: 0.6 % — SIGNIFICANT CHANGE UP (ref 0–2)
BASOPHILS NFR BLD AUTO: 0.6 % — SIGNIFICANT CHANGE UP (ref 0–2)
BILIRUB SERPL-MCNC: 0.4 MG/DL — SIGNIFICANT CHANGE UP (ref 0.2–1.2)
BILIRUB SERPL-MCNC: 0.4 MG/DL — SIGNIFICANT CHANGE UP (ref 0.2–1.2)
BILIRUB UR-MCNC: NEGATIVE — SIGNIFICANT CHANGE UP
BILIRUB UR-MCNC: NEGATIVE — SIGNIFICANT CHANGE UP
BUN SERPL-MCNC: 17 MG/DL — SIGNIFICANT CHANGE UP (ref 7–23)
BUN SERPL-MCNC: 17 MG/DL — SIGNIFICANT CHANGE UP (ref 7–23)
CALCIUM SERPL-MCNC: 9.7 MG/DL — SIGNIFICANT CHANGE UP (ref 8.4–10.5)
CALCIUM SERPL-MCNC: 9.7 MG/DL — SIGNIFICANT CHANGE UP (ref 8.4–10.5)
CAST: 0 /LPF — SIGNIFICANT CHANGE UP (ref 0–4)
CAST: 0 /LPF — SIGNIFICANT CHANGE UP (ref 0–4)
CHLORIDE SERPL-SCNC: 100 MMOL/L — SIGNIFICANT CHANGE UP (ref 96–108)
CHLORIDE SERPL-SCNC: 100 MMOL/L — SIGNIFICANT CHANGE UP (ref 96–108)
CO2 SERPL-SCNC: 25 MMOL/L — SIGNIFICANT CHANGE UP (ref 22–31)
CO2 SERPL-SCNC: 25 MMOL/L — SIGNIFICANT CHANGE UP (ref 22–31)
COLOR SPEC: YELLOW — SIGNIFICANT CHANGE UP
COLOR SPEC: YELLOW — SIGNIFICANT CHANGE UP
CREAT SERPL-MCNC: 0.53 MG/DL — SIGNIFICANT CHANGE UP (ref 0.5–1.3)
CREAT SERPL-MCNC: 0.53 MG/DL — SIGNIFICANT CHANGE UP (ref 0.5–1.3)
DIFF PNL FLD: ABNORMAL
DIFF PNL FLD: ABNORMAL
EGFR: 101 ML/MIN/1.73M2 — SIGNIFICANT CHANGE UP
EGFR: 101 ML/MIN/1.73M2 — SIGNIFICANT CHANGE UP
EOSINOPHIL # BLD AUTO: 0.16 K/UL — SIGNIFICANT CHANGE UP (ref 0–0.5)
EOSINOPHIL # BLD AUTO: 0.16 K/UL — SIGNIFICANT CHANGE UP (ref 0–0.5)
EOSINOPHIL NFR BLD AUTO: 2.5 % — SIGNIFICANT CHANGE UP (ref 0–6)
EOSINOPHIL NFR BLD AUTO: 2.5 % — SIGNIFICANT CHANGE UP (ref 0–6)
GLUCOSE SERPL-MCNC: 103 MG/DL — HIGH (ref 70–99)
GLUCOSE SERPL-MCNC: 103 MG/DL — HIGH (ref 70–99)
GLUCOSE UR QL: NEGATIVE MG/DL — SIGNIFICANT CHANGE UP
GLUCOSE UR QL: NEGATIVE MG/DL — SIGNIFICANT CHANGE UP
HCT VFR BLD CALC: 41.3 % — SIGNIFICANT CHANGE UP (ref 34.5–45)
HCT VFR BLD CALC: 41.3 % — SIGNIFICANT CHANGE UP (ref 34.5–45)
HGB BLD-MCNC: 13.8 G/DL — SIGNIFICANT CHANGE UP (ref 11.5–15.5)
HGB BLD-MCNC: 13.8 G/DL — SIGNIFICANT CHANGE UP (ref 11.5–15.5)
IMM GRANULOCYTES NFR BLD AUTO: 0.3 % — SIGNIFICANT CHANGE UP (ref 0–0.9)
IMM GRANULOCYTES NFR BLD AUTO: 0.3 % — SIGNIFICANT CHANGE UP (ref 0–0.9)
KETONES UR-MCNC: NEGATIVE MG/DL — SIGNIFICANT CHANGE UP
KETONES UR-MCNC: NEGATIVE MG/DL — SIGNIFICANT CHANGE UP
LEUKOCYTE ESTERASE UR-ACNC: ABNORMAL
LEUKOCYTE ESTERASE UR-ACNC: ABNORMAL
LYMPHOCYTES # BLD AUTO: 1.72 K/UL — SIGNIFICANT CHANGE UP (ref 1–3.3)
LYMPHOCYTES # BLD AUTO: 1.72 K/UL — SIGNIFICANT CHANGE UP (ref 1–3.3)
LYMPHOCYTES # BLD AUTO: 27.1 % — SIGNIFICANT CHANGE UP (ref 13–44)
LYMPHOCYTES # BLD AUTO: 27.1 % — SIGNIFICANT CHANGE UP (ref 13–44)
MAGNESIUM SERPL-MCNC: 2.3 MG/DL — SIGNIFICANT CHANGE UP (ref 1.6–2.6)
MAGNESIUM SERPL-MCNC: 2.3 MG/DL — SIGNIFICANT CHANGE UP (ref 1.6–2.6)
MCHC RBC-ENTMCNC: 30 PG — SIGNIFICANT CHANGE UP (ref 27–34)
MCHC RBC-ENTMCNC: 30 PG — SIGNIFICANT CHANGE UP (ref 27–34)
MCHC RBC-ENTMCNC: 33.4 GM/DL — SIGNIFICANT CHANGE UP (ref 32–36)
MCHC RBC-ENTMCNC: 33.4 GM/DL — SIGNIFICANT CHANGE UP (ref 32–36)
MCV RBC AUTO: 89.8 FL — SIGNIFICANT CHANGE UP (ref 80–100)
MCV RBC AUTO: 89.8 FL — SIGNIFICANT CHANGE UP (ref 80–100)
MONOCYTES # BLD AUTO: 0.59 K/UL — SIGNIFICANT CHANGE UP (ref 0–0.9)
MONOCYTES # BLD AUTO: 0.59 K/UL — SIGNIFICANT CHANGE UP (ref 0–0.9)
MONOCYTES NFR BLD AUTO: 9.3 % — SIGNIFICANT CHANGE UP (ref 2–14)
MONOCYTES NFR BLD AUTO: 9.3 % — SIGNIFICANT CHANGE UP (ref 2–14)
NEUTROPHILS # BLD AUTO: 3.81 K/UL — SIGNIFICANT CHANGE UP (ref 1.8–7.4)
NEUTROPHILS # BLD AUTO: 3.81 K/UL — SIGNIFICANT CHANGE UP (ref 1.8–7.4)
NEUTROPHILS NFR BLD AUTO: 60.2 % — SIGNIFICANT CHANGE UP (ref 43–77)
NEUTROPHILS NFR BLD AUTO: 60.2 % — SIGNIFICANT CHANGE UP (ref 43–77)
NITRITE UR-MCNC: NEGATIVE — SIGNIFICANT CHANGE UP
NITRITE UR-MCNC: NEGATIVE — SIGNIFICANT CHANGE UP
NRBC # BLD: 0 /100 WBCS — SIGNIFICANT CHANGE UP (ref 0–0)
NRBC # BLD: 0 /100 WBCS — SIGNIFICANT CHANGE UP (ref 0–0)
PH UR: 5.5 — SIGNIFICANT CHANGE UP (ref 5–8)
PH UR: 5.5 — SIGNIFICANT CHANGE UP (ref 5–8)
PLATELET # BLD AUTO: 197 K/UL — SIGNIFICANT CHANGE UP (ref 150–400)
PLATELET # BLD AUTO: 197 K/UL — SIGNIFICANT CHANGE UP (ref 150–400)
POTASSIUM SERPL-MCNC: 3.8 MMOL/L — SIGNIFICANT CHANGE UP (ref 3.5–5.3)
POTASSIUM SERPL-MCNC: 3.8 MMOL/L — SIGNIFICANT CHANGE UP (ref 3.5–5.3)
POTASSIUM SERPL-SCNC: 3.8 MMOL/L — SIGNIFICANT CHANGE UP (ref 3.5–5.3)
POTASSIUM SERPL-SCNC: 3.8 MMOL/L — SIGNIFICANT CHANGE UP (ref 3.5–5.3)
PROT SERPL-MCNC: 7.7 G/DL — SIGNIFICANT CHANGE UP (ref 6–8.3)
PROT SERPL-MCNC: 7.7 G/DL — SIGNIFICANT CHANGE UP (ref 6–8.3)
PROT UR-MCNC: NEGATIVE MG/DL — SIGNIFICANT CHANGE UP
PROT UR-MCNC: NEGATIVE MG/DL — SIGNIFICANT CHANGE UP
RBC # BLD: 4.6 M/UL — SIGNIFICANT CHANGE UP (ref 3.8–5.2)
RBC # BLD: 4.6 M/UL — SIGNIFICANT CHANGE UP (ref 3.8–5.2)
RBC # FLD: 12.3 % — SIGNIFICANT CHANGE UP (ref 10.3–14.5)
RBC # FLD: 12.3 % — SIGNIFICANT CHANGE UP (ref 10.3–14.5)
RBC CASTS # UR COMP ASSIST: 3 /HPF — SIGNIFICANT CHANGE UP (ref 0–4)
RBC CASTS # UR COMP ASSIST: 3 /HPF — SIGNIFICANT CHANGE UP (ref 0–4)
REVIEW: SIGNIFICANT CHANGE UP
REVIEW: SIGNIFICANT CHANGE UP
SODIUM SERPL-SCNC: 139 MMOL/L — SIGNIFICANT CHANGE UP (ref 135–145)
SODIUM SERPL-SCNC: 139 MMOL/L — SIGNIFICANT CHANGE UP (ref 135–145)
SP GR SPEC: 1.02 — SIGNIFICANT CHANGE UP (ref 1–1.03)
SP GR SPEC: 1.02 — SIGNIFICANT CHANGE UP (ref 1–1.03)
SQUAMOUS # UR AUTO: 2 /HPF — SIGNIFICANT CHANGE UP (ref 0–5)
SQUAMOUS # UR AUTO: 2 /HPF — SIGNIFICANT CHANGE UP (ref 0–5)
UROBILINOGEN FLD QL: 0.2 MG/DL — SIGNIFICANT CHANGE UP (ref 0.2–1)
UROBILINOGEN FLD QL: 0.2 MG/DL — SIGNIFICANT CHANGE UP (ref 0.2–1)
WBC # BLD: 6.34 K/UL — SIGNIFICANT CHANGE UP (ref 3.8–10.5)
WBC # BLD: 6.34 K/UL — SIGNIFICANT CHANGE UP (ref 3.8–10.5)
WBC # FLD AUTO: 6.34 K/UL — SIGNIFICANT CHANGE UP (ref 3.8–10.5)
WBC # FLD AUTO: 6.34 K/UL — SIGNIFICANT CHANGE UP (ref 3.8–10.5)
WBC UR QL: 9 /HPF — HIGH (ref 0–5)
WBC UR QL: 9 /HPF — HIGH (ref 0–5)

## 2023-11-18 PROCEDURE — 81001 URINALYSIS AUTO W/SCOPE: CPT

## 2023-11-18 PROCEDURE — 87086 URINE CULTURE/COLONY COUNT: CPT

## 2023-11-18 PROCEDURE — 36415 COLL VENOUS BLD VENIPUNCTURE: CPT

## 2023-11-18 PROCEDURE — 80053 COMPREHEN METABOLIC PANEL: CPT

## 2023-11-18 PROCEDURE — 85025 COMPLETE CBC W/AUTO DIFF WBC: CPT

## 2023-11-18 PROCEDURE — 83735 ASSAY OF MAGNESIUM: CPT

## 2023-11-18 PROCEDURE — 99283 EMERGENCY DEPT VISIT LOW MDM: CPT

## 2023-11-18 RX ORDER — CEPHALEXIN 500 MG
500 CAPSULE ORAL ONCE
Refills: 0 | Status: COMPLETED | OUTPATIENT
Start: 2023-11-18 | End: 2023-11-18

## 2023-11-18 RX ORDER — BACITRACIN ZINC 500 UNIT/G
1 OINTMENT IN PACKET (EA) TOPICAL
Qty: 1 | Refills: 0
Start: 2023-11-18 | End: 2023-11-22

## 2023-11-18 RX ORDER — CEPHALEXIN 500 MG
1 CAPSULE ORAL
Qty: 21 | Refills: 0
Start: 2023-11-18 | End: 2023-11-24

## 2023-11-18 RX ADMIN — Medication 500 MILLIGRAM(S): at 03:12

## 2023-11-18 NOTE — ED PROVIDER NOTE - ATTENDING CONTRIBUTION TO CARE
Jay Johnson MD, FACEP Jay Johnson MD, FACEP    The patient was serially evaluated throughout emergency department course by the team. There was no acute deterioration up to this time in the emergency department. The patient has demonstrated clinical improvement and/or stability, feels better at this time according to emergency department team. Agree with goals/plan of emergency department care as described in this physician's electronic medical record, including diagnostics, therapeutics and consultation recommendation as clinically warranted. Will discharge home with close outpatient follow up with primary care physician/provider and specialist if necessary. The patient and/or family was educated on expectant management and return precautions concerning signs and features to return to the emergency department, in layman terms, including but not limited to: nausea, vomiting, fever, chills, the inability to eat, take medications, or drink, persistent/worsening symptoms or any concerns at all. There are no acute or immediate life threatening issues present on history, clinical exam, or any diagnostic evaluation. The patient is a safe disposition home, has capacity and insight into their condition, is ambulatory in the Emergency Department with no further questions and will follow up with their doctor(s) this week. Diagnosis, prognosis, natural history and treatment was discussed with patient and/or family. The patient and/or family were given the opportunity to ask questions and have them answered in full. The patient and/or family are with capacity and insight into the situation, treatment, risks, benefits, alternative therapies, and understand that they can ask any further questions if needed. Patient and/or family/guardian understand anticipatory guidance were given strict return and follow up precautions.  The patient and/or family/guardian have been informed of all concerning signs and symptoms to return to Emergency Department, the necessity to follow up with the PMD/Clinic/follow up provided within 2-3 days was explained, and the patient and/or family reports understanding of above with capacity and insight. The patient and/or family/guardian were informed of any results of their tests and are were encouraged to follow up on the findings with their doctor as well as the need to inform their doctor of any results. The patient and/or family/guardian are aware of the need to follow up with repeat testing as applicable and report understanding of the above with capacity and insight. The patient and/or family/guardian was made aware of any pending test results at the time of discharge and of the need to call back for the final results as well as the need to inform their doctor of the results.

## 2023-11-18 NOTE — ED ADULT NURSE NOTE - OBJECTIVE STATEMENT
66 yo female PMh HLD, HTN, A&ox3, Mauritian speaking, presents to ED c/o wound drainage.  PT reports had recent urolift done on 10/18/23, but noticed her surgical site started to open and has been draining.  PT has right sided of abdomen.  Breathing even and unlabored, abdomen soft nontender, wound dehiscence [id number], no erythema, edema or purulent drainage, no pedal edema. Pt denies chest pain, palpitations, shortness of breath, headache, visual disturbances, numbness/tingling, fever, chills, diaphoresis,  nausea, vomiting, constipation, diarrhea, or urinary symptoms.

## 2023-11-18 NOTE — ED PROVIDER NOTE - PATIENT PORTAL LINK FT
You can access the FollowMyHealth Patient Portal offered by [facility] by registering at the following website: [email or web address]. By joining [facility]’s FollowMyHealth portal, you will also be able to view your health information using other applications (apps) compatible with our system.

## 2023-11-18 NOTE — ED PROVIDER NOTE - OBJECTIVE STATEMENT
Patient presents with chief complaint of right sided surgical trocar site dehisence with some light drainage from the wound. She had a urolift on 10/18/23 and the surgical site started to open this past week. She has not seen her surgeon Bradly Guadarrama for the issue. She denies fever or chills. There has been no drainage of pus per the patient.

## 2023-11-18 NOTE — ED PROVIDER NOTE - NSFOLLOWUPINSTRUCTIONS_ED_ALL_ED_FT
Follow up with your urologist this week, call and state you were in the emergency department for your surgical site problem and request a rapid follow up.    Follow up with your Urologist in 2-3 days or call our clinic at [phone]//[phone].     Take your antibiotics until they are fully completed.  There were no signs of an emergency medical condition on completion of today's workup.  You will need further medical care and evaluation. A presumptive diagnosis of wound dehiscence (separation) has been made, however further evaluation may be required by your primary care doctor or specialist for a definitive diagnosis.      Follow up with your medical doctor in 2-3 days or call our clinic at [phone] and state you were seen in the Emergency Department and would like to be seen in clinic. You may also call [phone] to speak with a representative to assist follow up care with medicine, surgery, or specialists.    If you are having pain, take Tylenol/acetaminophen 1 g every six hours and supplement (if allowed by your physician, and if you're not having gastric/gastrointestinal/stomach/intestinal problems) with ibuprofen 600 mg, with food or milk/maalox, every six hours which can be taken three hours apart from the Tylenol to have a layered effect.     Be sure to take no more than 4000mg or 4g of Tylenol/acetaminophen in a 24 hour period. Be sure to check your other medications to see if they include Tylenol/acetaminophen and include them in your calculations to ensure you do not take more than 4000mg or 4g of Tylenol/acetaminophen a day.    Drink at least 2 Liters or 64 Ounces of water each day (UNLESS you are supposed to restrict fluids or have a history of congestive heart failure (CHF)).    Return for any persistent, worsening symptoms, or ANY concerns at all.

## 2023-11-18 NOTE — ED PROVIDER NOTE - CLINICAL SUMMARY MEDICAL DECISION MAKING FREE TEXT BOX
Jay Johnson MD, FACEP: In this physician's medical judgement based on clinical history and physical exam the patient's signs and symptoms lead to differential diagnoses which includes but is not limited to: wound dehiscence    Historical features, symptoms, and clinical exam not consistent with: abscess, sepsis    Labs were ordered and independently reviewed by me.  Appropriate medications for the patient's presenting complaints were ordered, and effects were reassessed.     Patient's records including prior hospital visit, med and medical history were reviewed.   History assisted by partner  Escalation to admission/observation was considered. However, patient better served with outpatient primary medical doctor and/or specialist and given strict return precautions and expectant management.     Will follow up on labs, therapeutics, reassess and disposition as clinically indicated.  *The above represents an initial assessment/impression. Please refer to my progress notes below for potential changes in patient clinical course*

## 2023-11-18 NOTE — ED PROVIDER NOTE - PHYSICAL EXAMINATION
GEN: NAD, awake, eyes open spontaneously  EYES: normal conjunctiva, perrl  ENT: NCAT, MMM, Trachea midline  CHEST/LUNGS: Non-tachypneic, CTAB, bilateral breath sounds  CARDIAC: Non-tachycardic, normal perfusion  ABDOMEN: obese, surgical sites c/d and the right mid abdomen site with 4x2cm dehiscence and no surrounding erythema or edema, no fluctuance, NTND, No rebound/guarding  MSK: No edema, no gross deformity of extremities  SKIN: No rashes, no petechiae, no vesicles  NEURO: CN grossly intact, normal coordination, no focal motor or sensory deficits  PSYCH: Alert, appropriate, cooperative, with capacity and insight

## 2023-11-18 NOTE — ED PROVIDER NOTE - PROGRESS NOTE DETAILS
Uro DORA Patel was consulted to evaluate and recommend treatment and his team recommends keflex and discharge without imaging as it is not necessary for the nature of the complaint.

## 2023-11-20 LAB
CULTURE RESULTS: SIGNIFICANT CHANGE UP
CULTURE RESULTS: SIGNIFICANT CHANGE UP
SPECIMEN SOURCE: SIGNIFICANT CHANGE UP
SPECIMEN SOURCE: SIGNIFICANT CHANGE UP

## 2023-11-21 ENCOUNTER — APPOINTMENT (OUTPATIENT)
Dept: UROGYNECOLOGY | Facility: CLINIC | Age: 68
End: 2023-11-21

## 2024-01-12 ENCOUNTER — APPOINTMENT (OUTPATIENT)
Dept: OBGYN | Facility: CLINIC | Age: 69
End: 2024-01-12
Payer: MEDICARE

## 2024-01-12 ENCOUNTER — LABORATORY RESULT (OUTPATIENT)
Age: 69
End: 2024-01-12

## 2024-01-12 VITALS
OXYGEN SATURATION: 97 % | HEART RATE: 94 BPM | WEIGHT: 226 LBS | DIASTOLIC BLOOD PRESSURE: 90 MMHG | BODY MASS INDEX: 42.7 KG/M2 | SYSTOLIC BLOOD PRESSURE: 143 MMHG

## 2024-01-12 DIAGNOSIS — N39.0 URINARY TRACT INFECTION, SITE NOT SPECIFIED: ICD-10-CM

## 2024-01-12 DIAGNOSIS — Z98.890 OTHER SPECIFIED POSTPROCEDURAL STATES: ICD-10-CM

## 2024-01-12 DIAGNOSIS — I10 ESSENTIAL (PRIMARY) HYPERTENSION: ICD-10-CM

## 2024-01-12 DIAGNOSIS — J45.909 UNSPECIFIED ASTHMA, UNCOMPLICATED: ICD-10-CM

## 2024-01-12 DIAGNOSIS — N89.8 OTHER SPECIFIED NONINFLAMMATORY DISORDERS OF VAGINA: ICD-10-CM

## 2024-01-12 PROCEDURE — 99213 OFFICE O/P EST LOW 20 MIN: CPT

## 2024-01-12 RX ORDER — SIMVASTATIN 80 MG/1
TABLET, FILM COATED ORAL
Refills: 0 | Status: ACTIVE | COMMUNITY

## 2024-01-12 RX ORDER — LOSARTAN POTASSIUM 100 MG/1
TABLET, FILM COATED ORAL
Refills: 0 | Status: ACTIVE | COMMUNITY

## 2024-01-12 RX ORDER — ESTRADIOL 0.1 MG/G
0.1 CREAM VAGINAL
Qty: 1 | Refills: 3 | Status: ACTIVE | COMMUNITY
Start: 2024-01-12 | End: 1900-01-01

## 2024-01-12 NOTE — HISTORY OF PRESENT ILLNESS
[FreeTextEntry1] : new patient here for vaginal burning, suprapubic pain and dysuria has hx of recurrent UTI, has been placed on suppresive tx with nitrofuratoine, she does not take it  she had a recent sling procedure on 10/2023

## 2024-01-12 NOTE — PLAN
[FreeTextEntry1] : rx estradiol for vaginal dryness  rec to f/u with urologist for sling revision  urine cx sent rec to take nitrofuratoine 100mg BID for 5 days (she is symptomatix and nitrates pos)

## 2024-01-12 NOTE — PHYSICAL EXAM
[Labia Majora] : normal [Labia Minora] : normal [Atrophy] : atrophy [Normal] : normal [Uterine Adnexae] : normal [FreeTextEntry4] : tenderness at the site of the sling

## 2024-01-17 RX ORDER — SULFAMETHOXAZOLE AND TRIMETHOPRIM 800; 160 MG/1; MG/1
800-160 TABLET ORAL TWICE DAILY
Qty: 10 | Refills: 0 | Status: ACTIVE | COMMUNITY
Start: 2024-01-17 | End: 1900-01-01

## 2024-09-03 NOTE — ED PROVIDER NOTE - CHIEF COMPLAINT
Patient ID: Magnolia is a 86 year old female.    Chief Complaint   Patient presents with    Pain     Pain in the L leg intermittent. Onset a week ago. Very intense on Saturday. Went to urgent care over the weekend was given predisone. Patient reports not really working. No swelling at the moment. X rays are normal.      HPI    Pain L leg a little over a week ago. Went to [facility] about a week ago. Pain knee down to the shin. Some swelling on front of left leg but no calf redness or swelling. Had normal xrays. Given Medrol pack and helped a bit but still with pain. Then took 40mg prednisone x 2 days (had at home). Also taking Tylenol. Prior to this, did hit left hip against car door prior but [facility] did not think she warranted hip xrays. Has RA on Plaquenil. Sees Dr. Broussard and will see him on 9/25.     Review of Systems   Constitutional: Negative.    Respiratory:  Negative for shortness of breath.    Cardiovascular:  Negative for chest pain.   Musculoskeletal:         Left leg pain     Current Outpatient Medications   Medication Sig Dispense Refill    hydroxychloroquine (PLAQUENIL) 200 MG tablet Take 1 tablet by mouth daily. 90 tablet 0    levothyroxine 100 MCG tablet TAKE 1 TABLET BY MOUTH DAILY as directed 90 tablet 3    losartan (COZAAR) 25 MG tablet TAKE TWO TABLETS BY MOUTH DAILY 180 tablet 3    furosemide (LASIX) 20 MG tablet Take 20 mg by mouth daily.      oxybutynin (DITROPAN XL) 15 MG 24 hr tablet Take 1 tablet by mouth daily. 90 tablet 3    Astaxanthin 4 MG Cap Take 12 mg by mouth daily. (Patient not taking: Reported on 9/3/2024)      rosuvastatin (CRESTOR) 40 MG tablet Take 40 mg by mouth at bedtime.      senna (SENOKOT) 8.6 MG tablet Take 1 tablet by mouth daily.      mometasone (ELOCON) 0.1 % cream Apply 1 application topically 2 times daily as needed (rash). 45 g 0    apixaBAN (Eliquis) 5 MG Tab Take 1 tablet by mouth every 12 hours.      amLODIPine (NORVASC) 2.5 MG tablet Take 2.5 mg by mouth daily.       doxepin  (SINEQUAN) 10 MG capsule Take 10 mg by mouth daily.  (Patient not taking: Reported on 9/3/2024)      Ascorbic Acid (VITAMIN C) 100 MG tablet Take 500 mg by mouth daily.      calcium (RA CALCIUM) 500 MG tablet Take 1 tablet by mouth daily.       Cholecalciferol (D 1000) 1000 units capsule Take 1,000 Units by mouth daily.      Multiple Vitamin (MULTI-VITAMINS) Tab Take by mouth daily.      omeprazole (PRILOSEC) 20 MG capsule Take 20 mg by mouth daily.        No current facility-administered medications for this visit.     Problem List Items Addressed This Visit          Musculoskeletal and Injuries    Left leg pain - Primary     Past Medical History:   Diagnosis Date    Balance problem     uses cane or walker    Barretts esophagus     Cellulitis of left lower extremity 05/09/2018    CVA (cerebral vascular accident)  (CMD)     GERD (gastroesophageal reflux disease)     High cholesterol     [place] (hard of hearing)     use hearing aide    HTN (hypertension)     Hypothyroidism     Jaw claudication     HOLLIS (obstructive sleep apnea)     PAF (paroxysmal atrial fibrillation)  (CMD)     Pneumonia due to infectious organism 05/26/2020    Recheck a chest xray --was in [place]     Slurred speech risidual from TIA     occasional    SOB (shortness of breath) on exertion      Past Surgical History:   Procedure Laterality Date    Cardiac catherization  2012    Cataract extraction, bilateral      D and c      Inguinal hernia repair      x 2    Nissen fundoplication      X2    Total hip replacement Bilateral     Tubal ligation       Family History   Problem Relation Age of Onset    Cancer, Breast Mother     Myocardial Infarction Father     COPD Father     Cancer, Breast Brother     Pacemaker Brother      Social History     Socioeconomic History    Marital status: /Civil Union     Spouse name: [employer]    Number of children: 8    Years of education: Not on file    Highest education level: Not on file   Occupational History     Occupation: homemaker   Tobacco Use    Smoking status: Never    Smokeless tobacco: Never   Vaping Use    Vaping status: never used   Substance and Sexual Activity    Alcohol use: Not Currently    Drug use: Never    Sexual activity: Not Currently     Partners: Male   Other Topics Concern    Not on file   Social History Narrative    Children--8 and 1 passed away --7 living     Grandchildren -40     Great-grandchildren -29 plus 2 coming         is 89 yo--mentally good and walking and vision problems and has to give up driving likely     Goes to [employer] twice yearly and often to [place] for family events        Live in their own home and may need more help-- helps her cook --she tires quickly while doing house work         Leaving 8/21/23 for a trip out of the country to [place] for 2 weeks     Tires quickly                              Social Determinants of Health     Financial Resource Strain: Not on file   Food Insecurity: Not on file   Transportation Needs: Not on file   Physical Activity: Inactive (7/19/2019)    Exercise Vital Sign     Days of Exercise per Week: 0 days     Minutes of Exercise per Session: 0 min   Stress: Not on file   Social Connections: Unknown (3/13/2021)    [facility]    Social Connections     Conversations with friends/family/neighbors per week: Not on file   Interpersonal Safety: Not At Risk (9/13/2023)    Interpersonal Safety     Social Determinants: Intimate Partner Violence Past Fear: No     Social Determinants: Intimate Partner Violence Current Fear: No       Patient's medications, allergies, past medical, surgical, social and family histories were reviewed and updated as appropriate.      Visit Vitals  BP (!) 146/74   Pulse 70   Temp 98.1 °F (36.7 °C) (Tympanic)   Wt 64 kg (141 lb 1.5 oz)   SpO2 94%   BMI 23.84 kg/m²       Physical Exam  Vitals reviewed.   Constitutional:       General: She is not in acute  distress.     Appearance: Normal appearance. She is not ill-appearing, toxic-appearing or diaphoretic.   HENT:      Head: Normocephalic and atraumatic.   Cardiovascular:      Rate and Rhythm: Normal rate and regular rhythm.      Pulses: Normal pulses.      Heart sounds: Normal heart sounds. No murmur heard.     No friction rub. No gallop.   Pulmonary:      Effort: Pulmonary effort is normal. No respiratory distress.      Breath sounds: Normal breath sounds. No stridor. No wheezing, rhonchi or rales.   Musculoskeletal:      Right lower leg: No edema.      Left lower leg: No edema.      Comments: No knee swelling, no calf swelling or TTP  Negative Abdulaziz's sign bilaterally   Mild swelling L anterior shin area c/w muscular swelling  5/5 LE strength prox and dist   Skin:     General: Skin is warm and dry.   Neurological:      General: No focal deficit present.      Mental Status: She is alert and oriented to person, place, and time.      Cranial Nerves: No cranial nerve deficit.   Psychiatric:         Mood and Affect: Mood normal.         Behavior: Behavior normal.         Thought Content: Thought content normal.         Judgment: Judgment normal.           Magnolia was seen today for pain.    Diagnoses and all orders for this visit:    Left leg pain    Shin splints vs stress fracture vs muscular pain/swelling vs related to RA. Does not seem c/w sciatica. No signs of DVT. On anticoagulation.     Prednisone 40mg x 2 days, 30mg x 2 days, 20mg x 2 days, 10mg x 2 days.   Tylenol  Heat vs ice  Supportive shoes  F/u with PCP in 2 weeks, rheum in 3 weeks  May need MRI of leg in future if sx persist    Podiatry:  Dr. Richar Sanchez   Will see for cross over toe issues    Medical compliance with plan discussed and risks of non-compliance reviewed.    Patient education completed on disease process, etiology & prognosis.    Patient expresses understanding of the plan.     Katlyn Davis MD     The patient is a 62y Female complaining of abdominal pain.

## 2024-10-05 NOTE — ED POST DISCHARGE NOTE - RESULT SUMMARY
Patient received IV magnesium supplementation per primary team   no need to call back placed on appropriate antibioitics in the ed

## 2024-11-27 ENCOUNTER — EMERGENCY (EMERGENCY)
Facility: HOSPITAL | Age: 69
LOS: 1 days | Discharge: HOME | End: 2024-11-27
Attending: STUDENT IN AN ORGANIZED HEALTH CARE EDUCATION/TRAINING PROGRAM
Payer: COMMERCIAL

## 2024-11-27 VITALS
HEART RATE: 88 BPM | TEMPERATURE: 98 F | WEIGHT: 227.08 LBS | SYSTOLIC BLOOD PRESSURE: 132 MMHG | OXYGEN SATURATION: 98 % | DIASTOLIC BLOOD PRESSURE: 80 MMHG | RESPIRATION RATE: 16 BRPM

## 2024-11-27 DIAGNOSIS — Z90.49 ACQUIRED ABSENCE OF OTHER SPECIFIED PARTS OF DIGESTIVE TRACT: Chronic | ICD-10-CM

## 2024-11-27 PROCEDURE — 99284 EMERGENCY DEPT VISIT MOD MDM: CPT | Mod: 25

## 2024-11-28 VITALS
DIASTOLIC BLOOD PRESSURE: 82 MMHG | OXYGEN SATURATION: 98 % | TEMPERATURE: 98 F | RESPIRATION RATE: 16 BRPM | SYSTOLIC BLOOD PRESSURE: 135 MMHG | HEART RATE: 74 BPM

## 2024-11-28 LAB
ALBUMIN SERPL ELPH-MCNC: 3.6 G/DL — SIGNIFICANT CHANGE UP (ref 3.5–5)
ALP SERPL-CCNC: 64 U/L — SIGNIFICANT CHANGE UP (ref 40–120)
ALT FLD-CCNC: 47 U/L DA — SIGNIFICANT CHANGE UP (ref 10–60)
ANION GAP SERPL CALC-SCNC: 7 MMOL/L — SIGNIFICANT CHANGE UP (ref 5–17)
AST SERPL-CCNC: 49 U/L — HIGH (ref 10–40)
BASOPHILS # BLD AUTO: 0.04 K/UL — SIGNIFICANT CHANGE UP (ref 0–0.2)
BASOPHILS NFR BLD AUTO: 0.6 % — SIGNIFICANT CHANGE UP (ref 0–2)
BILIRUB SERPL-MCNC: 0.5 MG/DL — SIGNIFICANT CHANGE UP (ref 0.2–1.2)
BUN SERPL-MCNC: 19 MG/DL — HIGH (ref 7–18)
CALCIUM SERPL-MCNC: 8.5 MG/DL — SIGNIFICANT CHANGE UP (ref 8.4–10.5)
CHLORIDE SERPL-SCNC: 108 MMOL/L — SIGNIFICANT CHANGE UP (ref 96–108)
CO2 SERPL-SCNC: 23 MMOL/L — SIGNIFICANT CHANGE UP (ref 22–31)
CREAT SERPL-MCNC: 0.77 MG/DL — SIGNIFICANT CHANGE UP (ref 0.5–1.3)
EGFR: 83 ML/MIN/1.73M2 — SIGNIFICANT CHANGE UP
EGFR: 83 ML/MIN/1.73M2 — SIGNIFICANT CHANGE UP
EOSINOPHIL # BLD AUTO: 0.19 K/UL — SIGNIFICANT CHANGE UP (ref 0–0.5)
EOSINOPHIL NFR BLD AUTO: 2.7 % — SIGNIFICANT CHANGE UP (ref 0–6)
GLUCOSE SERPL-MCNC: 119 MG/DL — HIGH (ref 70–99)
HCT VFR BLD CALC: 42.8 % — SIGNIFICANT CHANGE UP (ref 34.5–45)
HGB BLD-MCNC: 14.5 G/DL — SIGNIFICANT CHANGE UP (ref 11.5–15.5)
IMM GRANULOCYTES NFR BLD AUTO: 0.1 % — SIGNIFICANT CHANGE UP (ref 0–0.9)
LYMPHOCYTES # BLD AUTO: 1.9 K/UL — SIGNIFICANT CHANGE UP (ref 1–3.3)
LYMPHOCYTES # BLD AUTO: 27 % — SIGNIFICANT CHANGE UP (ref 13–44)
MCHC RBC-ENTMCNC: 30.1 PG — SIGNIFICANT CHANGE UP (ref 27–34)
MCHC RBC-ENTMCNC: 33.9 G/DL — SIGNIFICANT CHANGE UP (ref 32–36)
MCV RBC AUTO: 89 FL — SIGNIFICANT CHANGE UP (ref 80–100)
MONOCYTES # BLD AUTO: 0.61 K/UL — SIGNIFICANT CHANGE UP (ref 0–0.9)
MONOCYTES NFR BLD AUTO: 8.7 % — SIGNIFICANT CHANGE UP (ref 2–14)
NEUTROPHILS # BLD AUTO: 4.29 K/UL — SIGNIFICANT CHANGE UP (ref 1.8–7.4)
NEUTROPHILS NFR BLD AUTO: 60.9 % — SIGNIFICANT CHANGE UP (ref 43–77)
NRBC # BLD: 0 /100 WBCS — SIGNIFICANT CHANGE UP (ref 0–0)
NRBC BLD-RTO: 0 /100 WBCS — SIGNIFICANT CHANGE UP (ref 0–0)
PLATELET # BLD AUTO: 192 K/UL — SIGNIFICANT CHANGE UP (ref 150–400)
POTASSIUM SERPL-MCNC: 4.4 MMOL/L — SIGNIFICANT CHANGE UP (ref 3.5–5.3)
POTASSIUM SERPL-SCNC: 4.4 MMOL/L — SIGNIFICANT CHANGE UP (ref 3.5–5.3)
PROT SERPL-MCNC: 7.3 G/DL — SIGNIFICANT CHANGE UP (ref 6–8.3)
RBC # BLD: 4.81 M/UL — SIGNIFICANT CHANGE UP (ref 3.8–5.2)
RBC # FLD: 12.5 % — SIGNIFICANT CHANGE UP (ref 10.3–14.5)
SODIUM SERPL-SCNC: 138 MMOL/L — SIGNIFICANT CHANGE UP (ref 135–145)
WBC # BLD: 7.04 K/UL — SIGNIFICANT CHANGE UP (ref 3.8–10.5)
WBC # FLD AUTO: 7.04 K/UL — SIGNIFICANT CHANGE UP (ref 3.8–10.5)

## 2024-11-28 PROCEDURE — 96375 TX/PRO/DX INJ NEW DRUG ADDON: CPT

## 2024-11-28 PROCEDURE — 85025 COMPLETE CBC W/AUTO DIFF WBC: CPT

## 2024-11-28 PROCEDURE — 99284 EMERGENCY DEPT VISIT MOD MDM: CPT | Mod: 25

## 2024-11-28 PROCEDURE — 36415 COLL VENOUS BLD VENIPUNCTURE: CPT

## 2024-11-28 PROCEDURE — 70450 CT HEAD/BRAIN W/O DYE: CPT | Mod: 26,MC

## 2024-11-28 PROCEDURE — 96374 THER/PROPH/DIAG INJ IV PUSH: CPT

## 2024-11-28 PROCEDURE — 70450 CT HEAD/BRAIN W/O DYE: CPT | Mod: MC

## 2024-11-28 PROCEDURE — 80053 COMPREHEN METABOLIC PANEL: CPT

## 2024-11-28 RX ORDER — METOCLOPRAMIDE HCL 10 MG
10 TABLET ORAL ONCE
Refills: 0 | Status: COMPLETED | OUTPATIENT
Start: 2024-11-28 | End: 2024-11-28

## 2024-11-28 RX ORDER — ACETAMINOPHEN 500 MG/5ML
975 LIQUID (ML) ORAL ONCE
Refills: 0 | Status: COMPLETED | OUTPATIENT
Start: 2024-11-28 | End: 2024-11-28

## 2024-11-28 RX ORDER — KETOROLAC TROMETHAMINE 30 MG/ML
15 INJECTION, SOLUTION INTRAMUSCULAR; INTRAVENOUS ONCE
Refills: 0 | Status: DISCONTINUED | OUTPATIENT
Start: 2024-11-28 | End: 2024-11-28

## 2024-11-28 RX ADMIN — KETOROLAC TROMETHAMINE 15 MILLIGRAM(S): 30 INJECTION, SOLUTION INTRAMUSCULAR; INTRAVENOUS at 02:28

## 2024-11-28 RX ADMIN — Medication 975 MILLIGRAM(S): at 01:58

## 2024-11-28 RX ADMIN — Medication 10 MILLIGRAM(S): at 01:58

## 2024-11-28 RX ADMIN — Medication 2000 MILLILITER(S): at 01:58

## 2025-01-30 NOTE — ED PROVIDER NOTE - NSDCPRINTRESULTS_ED_ALL_ED
CURRY Gore   [facility]  [address]  [place] [address]     PATIENT NAME: Ivon Driscoll  : 1964  DATE: 25  MRN: [id number]      Billing Provider: CURRY Gore  Level of Service:   Patient PCP Information       Provider PCP Type    CURRY Carmen [facility]            Reason for Visit / Chief Complaint: Cough (Cough, hoarse started last night Had flu week before last )   Health Maintenance Due   Topic Date Due    TETANUS VACCINE  Never done    Shingles Vaccine (1 of 2) Never done    Pneumococcal Vaccines (Age 50+) (2 of 2 - PCV) 2013    Colorectal Cancer Screening  2021    RSV Vaccine (Age 60+ and Pregnant patients) (1 - Risk 60-74 years 1-dose series) Never done    COVID-19 Vaccine ( season) Never done          Update PCP  Update Chief Complaint         History of Present Illness / Problem Focused Workflow     History of Present Illness    CHIEF COMPLAINT:  Patient presents today for follow up of flu symptoms    FLU-LIKE SYMPTOMS:  She reports a productive cough with yellow-green sputum and sore throat. She denies headaches, ear pain, nasal congestion, or rhinorrhea.    MEDICATIONS:  She reports taking previously prescribed medications for her current condition.      ROS:  General: -fever, -chills, -fatigue, -weight gain, -weight loss  Eyes: -vision changes, -redness, -discharge  ENT: -ear pain, -nasal congestion, +sore throat  Cardiovascular: -chest pain, -palpitations, -lower extremity edema  Respiratory: +cough, -shortness of breath, +sputum production  Gastrointestinal: -abdominal pain, -nausea, -vomiting, -diarrhea, -constipation, -blood in stool  Genitourinary: -dysuria, -hematuria, -frequency  Musculoskeletal: -joint pain, -muscle pain  Skin: -rash, -lesion  Neurological: -headache, -dizziness, -numbness, -tingling  Psychiatric: -anxiety, -depression, -sleep difficulty          Hemoglobin A1C   Date Value Ref Range Status   2023  5.6 4.5 - 6.6 % Final     Comment:       Normal:               <5.7%  Pre-Diabetic:       5.7% to 6.4%  Diabetic:             >6.4%  Diabetic Goal:     <7%        CMP  Sodium   Date Value Ref Range Status   11/13/2024 140 136 - 145 mmol/L Final     Potassium   Date Value Ref Range Status   11/13/2024 4.8 3.5 - 5.1 mmol/L Final     Chloride   Date Value Ref Range Status   11/13/2024 106 98 - 107 mmol/L Final     CO2   Date Value Ref Range Status   11/13/2024 27 23 - 31 mmol/L Final     Glucose   Date Value Ref Range Status   11/13/2024 87 82 - 115 mg/dL Final     BUN   Date Value Ref Range Status   11/13/2024 14 10 - 20 mg/dL Final     Creatinine   Date Value Ref Range Status   11/13/2024 0.87 0.55 - 1.02 mg/dL Final     Calcium   Date Value Ref Range Status   11/13/2024 10.0 8.4 - 10.2 mg/dL Final     Total Protein   Date Value Ref Range Status   11/13/2024 7.5 5.8 - 7.6 g/dL Final     Albumin   Date Value Ref Range Status   11/13/2024 4.2 3.4 - 4.8 g/dL Final     Bilirubin, Total   Date Value Ref Range Status   11/13/2024 0.5 <=1.5 mg/dL Final     Alk Phos   Date Value Ref Range Status   11/13/2024 36 (L) 40 - 150 U/L Final     AST   Date Value Ref Range Status   11/13/2024 27 5 - 34 U/L Final     ALT   Date Value Ref Range Status   11/13/2024 11 0 - 55 U/L Final     Anion Gap   Date Value Ref Range Status   11/13/2024 12 7 - 16 mmol/L Final     eGFR   Date Value Ref Range Status   11/13/2024 76 >=60 mL/min/1.73m2 Final        Lab Results   Component Value Date    WBC 4.69 11/13/2024    RBC 4.22 11/13/2024    HGB 12.1 11/13/2024    HCT 37.8 (L) 11/13/2024    MCV 89.6 11/13/2024    MCH 28.7 11/13/2024    MCHC 32.0 11/13/2024    RDW 12.2 11/13/2024     11/13/2024    MPV 9.9 11/13/2024    LYMPH 34.1 11/13/2024    LYMPH 1.60 11/13/2024    MONO 7.9 (H) 11/13/2024    EOS 0.03 11/13/2024    BASO 0.01 11/13/2024    EOSINOPHIL 0.6 (L) 11/13/2024    BASOPHIL 0.2 11/13/2024        Lab Results   Component Value Date     CHOL 176 11/13/2024    CHOL 265 (H) 08/02/2024    CHOL 263 (H) 05/01/2024     Lab Results   Component Value Date    HDL 61 (H) 11/13/2024    HDL 58 08/02/2024    HDL 60 05/01/2024     Lab Results   Component Value Date    LDLCALC 94 11/13/2024    LDLCALC 178 08/02/2024    LDLCALC 166 05/01/2024     Lab Results   Component Value Date    TRIG 103 11/13/2024    TRIG 144 08/02/2024    TRIG 183 (H) 05/01/2024     Lab Results   Component Value Date    CHOLHDL 2.9 11/13/2024    CHOLHDL 4.6 08/02/2024    CHOLHDL 4.4 05/01/2024        Wt Readings from Last 3 Encounters:   01/30/25 1114 87.5 kg (193 lb)   01/15/25 1357 87.5 kg (193 lb)   11/13/24 0924 88 kg (194 lb)        BP Readings from Last 3 Encounters:   01/30/25 132/80   01/15/25 135/81   11/13/24 122/77        Review of Systems     Review of Systems       Medical / Social / Family History     Past Medical History:   Diagnosis Date    GERD (gastroesophageal reflux disease)     Mixed hyperlipidemia 11/25/2020    Other long term (current) drug therapy 11/25/2020       Past Surgical History:   Procedure Laterality Date    bladder tack      ESOPHAGOGASTRODUODENOSCOPY      HYSTERECTOMY      OOPHORECTOMY         Social History  Ms.  reports that she has never smoked. She has been exposed to tobacco smoke. She has never used smokeless tobacco. She reports that she does not drink alcohol and does not use drugs.    Family History  Ms.'s family history includes Colon cancer in her mother; Diabetes in her mother; Heart disease in her father; Hypertension in her father; Osteoarthritis in her mother; Stomach cancer in her sister.    Medications and Allergies     Medications  Outpatient Medications Marked as Taking for the 1/30/25 encounter (Office Visit) with Criselda Richardson FNP   Medication Sig Dispense Refill    aspirin 81 MG Chew Take 81 mg by mouth once daily.      fluticasone propionate (FLONASE) 50 mcg/actuation nasal spray 1 spray (50 mcg total) by Each Nostril route once  "daily. 32 mL 0    lisinopriL (PRINIVIL,ZESTRIL) 20 MG tablet Take 1 tablet (20 mg total) by mouth once daily. 90 tablet 1    loratadine (CLARITIN) 10 mg tablet Take 1 tablet (10 mg total) by mouth once daily. 30 tablet 11    omeprazole (PRILOSEC) 40 MG capsule Take 1 capsule (40 mg total) by mouth every morning. 90 capsule 1    rosuvastatin (CRESTOR) 10 MG tablet Take 1 tablet (10 mg total) by mouth once daily. 90 tablet 1     Current Facility-Administered Medications for the 1/30/25 encounter (Office Visit) with Criselda Richardson FNP   Medication Dose Route Frequency Provider Last Rate Last Admin    acetaminophen tablet 650 mg  650 mg Oral Once PRMarci Conway FNP        [COMPLETED] dexAMETHasone injection 4 mg  4 mg Intramuscular 1 time in Clinic/HOD Criselda Richardson FNP   4 mg at 01/30/25 1158       Allergies  Review of patient's allergies indicates:   Allergen Reactions    Keflex [cephalexin]      Facial swelling       Physical Examination     Vitals:    01/30/25 1114 01/30/25 1409   BP: (!) 149/82 132/80   BP Location: Left arm    Patient Position: Sitting    Pulse: 80    Resp: 18    Temp: 98.3 °F (36.8 °C)    TempSrc: Oral    SpO2: 97%    Weight: 87.5 kg (193 lb)    Height: 5' 5" (1.651 m)       Physical Exam  Constitutional:       Appearance: Normal appearance. She is obese.   HENT:      Head: Normocephalic.      Right Ear: Hearing, ear canal and external ear normal. A middle ear effusion is present.      Left Ear: Hearing, ear canal and external ear normal. A middle ear effusion is present.      Nose: Congestion present.      Right Turbinates: Swollen.      Left Turbinates: Swollen.      Mouth/Throat:      Mouth: Mucous membranes are moist.      Pharynx: Oropharynx is clear.   Eyes:      Extraocular Movements: Extraocular movements intact.   Cardiovascular:      Rate and Rhythm: Normal rate and regular rhythm.      Pulses: Normal pulses.      Heart sounds: Normal heart sounds.   Pulmonary:      Effort: " Pulmonary effort is normal.      Breath sounds: Normal breath sounds.   Skin:     General: Skin is warm and dry.      Capillary Refill: Capillary refill takes less than 2 seconds.   Neurological:      General: No focal deficit present.      Mental Status: She is alert and oriented to person, place, and time.   Psychiatric:         Mood and Affect: Mood normal.         Behavior: Behavior normal.          Assessment and Plan (including Health Maintenance)      Problem List  Smart Sets  Document Outside HM   :    Plan:     There are no Patient Instructions on file for this visit.       Health Maintenance Due   Topic Date Due    TETANUS VACCINE  Never done    Shingles Vaccine (1 of 2) Never done    Pneumococcal Vaccines (Age 50+) (2 of 2 - PCV) 05/23/2013    Colorectal Cancer Screening  08/24/2021    RSV Vaccine (Age 60+ and Pregnant patients) (1 - Risk 60-74 years 1-dose series) Never done    COVID-19 Vaccine (1 - 2024-25 season) Never done       Problem List Items Addressed This Visit    None  Visit Diagnoses       Laryngitis    -  Primary    Relevant Medications    dexAMETHasone injection 4 mg (Completed)    azithromycin (Z-DARREN) 250 MG tablet          Assessment & Plan    IMPRESSION:  - Assessed ongoing symptoms following recent flu illness  - Evaluated for potential complications or secondary infections  - Noted presence of productive cough with yellow-green sputum  - Checked for fluid in ears; minimal fluid observed, no significant erythema  - Considered steroid injection and antibiotic treatment given persistent symptoms    UPPER RESPIRATORY INFECTION:  - Administered steroid injection during the visit.  - Initiated azithromycin (Z-Darren) treatment.  - Patient recently recovered from influenza but is still feeling unwell.  - Continued previously prescribed medication for nasal congestion.  - Patient reports coughing up yellow-green sputum and experiencing pharyngitis.  - Performed physical exam, noting minimal fluid  in the ears without erythema.  - Planned to examine the patient further to rule out other conditions.  - Administered steroid injection and prescribed azithromycin (Z-Marin) to prevent exacerbation of the condition.  - Patient reports expectorating yellow-green sputum.        Laryngitis  -     dexAMETHasone injection 4 mg  -     azithromycin (Z-MARIN) 250 MG tablet; Take 2 tablets by mouth on day 1; Take 1 tablet by mouth on days 2-5  Dispense: 6 tablet; Refill: 0       Health Maintenance Topics with due status: Not Due       Topic Last Completion Date    Hemoglobin A1c (Diabetic Prevention Screening) 07/11/2023    Mammogram 05/17/2024    Lipid Panel 11/13/2024         Future Appointments   Date Time Provider Department Center   2/6/2025 10:00 AM [facility] GI ROOM 03 [facility]   2/24/2025 12:00 PM [facility] GI ROOM 03 [facility]   5/14/2025  8:00 AM Ama Hwang FNP [facility] [place]   5/23/2025  8:40 AM [facility]   8/4/2025  9:20 AM [facility] [place]        No follow-ups on file.    Health Maintenance Due   Topic Date Due    TETANUS VACCINE  Never done    Shingles Vaccine (1 of 2) Never done    Pneumococcal Vaccines (Age 50+) (2 of 2 - PCV) 05/23/2013    Colorectal Cancer Screening  08/24/2021    RSV Vaccine (Age 60+ and Pregnant patients) (1 - Risk 60-74 years 1-dose series) Never done    COVID-19 Vaccine (1 - 2024-25 season) Never done        Signature:  CURRY Gore    Date of encounter: 1/30/25  This note was generated with the assistance of ambient listening technology. Verbal consent was obtained by the patient and accompanying visitor(s) for the recording of patient appointment to facilitate this note. I attest to having reviewed and edited the generated note for accuracy, though some syntax or spelling errors may persist. Please contact the author of this note for any clarification.        Patient requests all Lab, Cardiology, and Radiology Results on their Discharge Instructions Patent

## 2025-03-03 NOTE — ED ADULT NURSE NOTE - HIV OFFER
Pt presents to ED with mom for concerns of reaction to doxycycline and dizziness. Pt seen yesterday and dx with pneumonia. Pt states feels nauseated after taking doxycycline.      Triage Assessment (Pediatric)       Row Name 03/03/25 1500          Triage Assessment    Airway WDL WDL        Respiratory WDL    Respiratory WDL X;cough        Cognitive/Neuro/Behavioral WDL    Cognitive/Neuro/Behavioral WDL WDL                      Previously Declined (within the last year)

## 2025-05-05 NOTE — ED PROVIDER NOTE - CPE EDP SKIN NORM
-- DO NOT REPLY / DO NOT REPLY ALL --  -- This inbox is not monitored. If this was sent to the wrong provider or department, reroute message to P [employer] pool. --  -- Message is from Engagement Center Operations (ECO) --  Reason for Appointment Message: Caller wants sooner er follow up appointment - all locations with clinician were offered    Reason for Visit: er follow up [facility] due to fractured rib; appt scheduled but patient prefers [facility]    Is the patient currently scheduled? Yes. Appointment Date:5/15 @ 8am    Preferred time to be seen: sooner than 5/15    Caller Information       Contact Date/Time Type Contact Phone/Fax    05/05/2025 02:03 PM CDT Phone (Incoming) Christine [phone]            Alternative phone number: n/a    Can a detailed message be left?  Yes - [employer] Message  & voicemail  Patient has been advised the message will be addressed within 2-3 business days            normal...

## 2025-08-18 ENCOUNTER — EMERGENCY (EMERGENCY)
Facility: HOSPITAL | Age: 70
LOS: 1 days | End: 2025-08-18
Attending: EMERGENCY MEDICINE
Payer: COMMERCIAL

## 2025-08-18 VITALS
OXYGEN SATURATION: 96 % | DIASTOLIC BLOOD PRESSURE: 81 MMHG | HEIGHT: 61 IN | TEMPERATURE: 98 F | SYSTOLIC BLOOD PRESSURE: 159 MMHG | RESPIRATION RATE: 19 BRPM | WEIGHT: 227.08 LBS | HEART RATE: 92 BPM

## 2025-08-18 VITALS
TEMPERATURE: 98 F | DIASTOLIC BLOOD PRESSURE: 81 MMHG | OXYGEN SATURATION: 97 % | SYSTOLIC BLOOD PRESSURE: 134 MMHG | HEART RATE: 77 BPM | RESPIRATION RATE: 18 BRPM

## 2025-08-18 DIAGNOSIS — Z90.49 ACQUIRED ABSENCE OF OTHER SPECIFIED PARTS OF DIGESTIVE TRACT: Chronic | ICD-10-CM

## 2025-08-18 LAB
ALBUMIN SERPL ELPH-MCNC: 3.7 G/DL — SIGNIFICANT CHANGE UP (ref 3.5–5)
ALP SERPL-CCNC: 68 U/L — SIGNIFICANT CHANGE UP (ref 40–120)
ALT FLD-CCNC: 34 U/L DA — SIGNIFICANT CHANGE UP (ref 10–60)
ANION GAP SERPL CALC-SCNC: 5 MMOL/L — SIGNIFICANT CHANGE UP (ref 5–17)
AST SERPL-CCNC: 13 U/L — SIGNIFICANT CHANGE UP (ref 10–40)
BASOPHILS # BLD AUTO: 0.05 K/UL — SIGNIFICANT CHANGE UP (ref 0–0.2)
BASOPHILS NFR BLD AUTO: 0.6 % — SIGNIFICANT CHANGE UP (ref 0–2)
BILIRUB SERPL-MCNC: 0.6 MG/DL — SIGNIFICANT CHANGE UP (ref 0.2–1.2)
BUN SERPL-MCNC: 16 MG/DL — SIGNIFICANT CHANGE UP (ref 7–18)
CALCIUM SERPL-MCNC: 8.6 MG/DL — SIGNIFICANT CHANGE UP (ref 8.4–10.5)
CHLORIDE SERPL-SCNC: 102 MMOL/L — SIGNIFICANT CHANGE UP (ref 96–108)
CO2 SERPL-SCNC: 30 MMOL/L — SIGNIFICANT CHANGE UP (ref 22–31)
CREAT SERPL-MCNC: 0.68 MG/DL — SIGNIFICANT CHANGE UP (ref 0.5–1.3)
EGFR: 94 ML/MIN/1.73M2 — SIGNIFICANT CHANGE UP
EGFR: 94 ML/MIN/1.73M2 — SIGNIFICANT CHANGE UP
EOSINOPHIL # BLD AUTO: 0.14 K/UL — SIGNIFICANT CHANGE UP (ref 0–0.5)
EOSINOPHIL NFR BLD AUTO: 1.8 % — SIGNIFICANT CHANGE UP (ref 0–6)
FLUAV AG NPH QL: SIGNIFICANT CHANGE UP
FLUBV AG NPH QL: SIGNIFICANT CHANGE UP
GLUCOSE SERPL-MCNC: 97 MG/DL — SIGNIFICANT CHANGE UP (ref 70–99)
HCT VFR BLD CALC: 47 % — HIGH (ref 34.5–45)
HGB BLD-MCNC: 15.7 G/DL — HIGH (ref 11.5–15.5)
IMM GRANULOCYTES NFR BLD AUTO: 0.9 % — SIGNIFICANT CHANGE UP (ref 0–0.9)
LYMPHOCYTES # BLD AUTO: 1.68 K/UL — SIGNIFICANT CHANGE UP (ref 1–3.3)
LYMPHOCYTES # BLD AUTO: 21.5 % — SIGNIFICANT CHANGE UP (ref 13–44)
MCHC RBC-ENTMCNC: 29.3 PG — SIGNIFICANT CHANGE UP (ref 27–34)
MCHC RBC-ENTMCNC: 33.4 G/DL — SIGNIFICANT CHANGE UP (ref 32–36)
MCV RBC AUTO: 87.9 FL — SIGNIFICANT CHANGE UP (ref 80–100)
MONOCYTES # BLD AUTO: 0.57 K/UL — SIGNIFICANT CHANGE UP (ref 0–0.9)
MONOCYTES NFR BLD AUTO: 7.3 % — SIGNIFICANT CHANGE UP (ref 2–14)
NEUTROPHILS # BLD AUTO: 5.31 K/UL — SIGNIFICANT CHANGE UP (ref 1.8–7.4)
NEUTROPHILS NFR BLD AUTO: 67.9 % — SIGNIFICANT CHANGE UP (ref 43–77)
NRBC BLD AUTO-RTO: 0 /100 WBCS — SIGNIFICANT CHANGE UP (ref 0–0)
PLATELET # BLD AUTO: 222 K/UL — SIGNIFICANT CHANGE UP (ref 150–400)
POTASSIUM SERPL-MCNC: 3.4 MMOL/L — LOW (ref 3.5–5.3)
POTASSIUM SERPL-SCNC: 3.4 MMOL/L — LOW (ref 3.5–5.3)
PROT SERPL-MCNC: 7.1 G/DL — SIGNIFICANT CHANGE UP (ref 6–8.3)
RBC # BLD: 5.35 M/UL — HIGH (ref 3.8–5.2)
RBC # FLD: 12.7 % — SIGNIFICANT CHANGE UP (ref 10.3–14.5)
RSV RNA NPH QL NAA+NON-PROBE: SIGNIFICANT CHANGE UP
SARS-COV-2 RNA SPEC QL NAA+PROBE: SIGNIFICANT CHANGE UP
SODIUM SERPL-SCNC: 137 MMOL/L — SIGNIFICANT CHANGE UP (ref 135–145)
SOURCE RESPIRATORY: SIGNIFICANT CHANGE UP
TROPONIN I, HIGH SENSITIVITY RESULT: 4.9 NG/L — SIGNIFICANT CHANGE UP
WBC # BLD: 7.82 K/UL — SIGNIFICANT CHANGE UP (ref 3.8–10.5)
WBC # FLD AUTO: 7.82 K/UL — SIGNIFICANT CHANGE UP (ref 3.8–10.5)

## 2025-08-18 PROCEDURE — 74177 CT ABD & PELVIS W/CONTRAST: CPT | Mod: 26

## 2025-08-18 PROCEDURE — 84484 ASSAY OF TROPONIN QUANT: CPT

## 2025-08-18 PROCEDURE — 71046 X-RAY EXAM CHEST 2 VIEWS: CPT | Mod: 26

## 2025-08-18 PROCEDURE — 99285 EMERGENCY DEPT VISIT HI MDM: CPT

## 2025-08-18 PROCEDURE — 74177 CT ABD & PELVIS W/CONTRAST: CPT

## 2025-08-18 PROCEDURE — 93005 ELECTROCARDIOGRAM TRACING: CPT

## 2025-08-18 PROCEDURE — 96375 TX/PRO/DX INJ NEW DRUG ADDON: CPT

## 2025-08-18 PROCEDURE — 80053 COMPREHEN METABOLIC PANEL: CPT

## 2025-08-18 PROCEDURE — 85025 COMPLETE CBC W/AUTO DIFF WBC: CPT

## 2025-08-18 PROCEDURE — 93010 ELECTROCARDIOGRAM REPORT: CPT

## 2025-08-18 PROCEDURE — 36415 COLL VENOUS BLD VENIPUNCTURE: CPT

## 2025-08-18 PROCEDURE — 99285 EMERGENCY DEPT VISIT HI MDM: CPT | Mod: 25

## 2025-08-18 PROCEDURE — 71046 X-RAY EXAM CHEST 2 VIEWS: CPT

## 2025-08-18 PROCEDURE — 87637 SARSCOV2&INF A&B&RSV AMP PRB: CPT

## 2025-08-18 PROCEDURE — 96374 THER/PROPH/DIAG INJ IV PUSH: CPT | Mod: XU

## 2025-08-18 RX ORDER — ONDANSETRON HCL/PF 4 MG/2 ML
4 VIAL (ML) INJECTION ONCE
Refills: 0 | Status: COMPLETED | OUTPATIENT
Start: 2025-08-18 | End: 2025-08-18

## 2025-08-18 RX ORDER — ACETAMINOPHEN 500 MG/5ML
975 LIQUID (ML) ORAL ONCE
Refills: 0 | Status: COMPLETED | OUTPATIENT
Start: 2025-08-18 | End: 2025-08-18

## 2025-08-18 RX ORDER — IBUPROFEN 200 MG
600 TABLET ORAL ONCE
Refills: 0 | Status: COMPLETED | OUTPATIENT
Start: 2025-08-18 | End: 2025-08-18

## 2025-08-18 RX ORDER — LIDOCAINE HYDROCHLORIDE 20 MG/ML
1 JELLY TOPICAL ONCE
Refills: 0 | Status: COMPLETED | OUTPATIENT
Start: 2025-08-18 | End: 2025-08-18

## 2025-08-18 RX ADMIN — Medication 20 MILLIGRAM(S): at 14:03

## 2025-08-18 RX ADMIN — Medication 600 MILLIGRAM(S): at 11:42

## 2025-08-18 RX ADMIN — Medication 600 MILLIGRAM(S): at 12:12

## 2025-08-18 RX ADMIN — LIDOCAINE HYDROCHLORIDE 1 PATCH: 20 JELLY TOPICAL at 11:43

## 2025-08-18 RX ADMIN — Medication 975 MILLIGRAM(S): at 12:12

## 2025-08-18 RX ADMIN — Medication 4 MILLIGRAM(S): at 14:03

## 2025-08-18 RX ADMIN — Medication 975 MILLIGRAM(S): at 11:42
